# Patient Record
Sex: FEMALE | Race: WHITE | NOT HISPANIC OR LATINO
[De-identification: names, ages, dates, MRNs, and addresses within clinical notes are randomized per-mention and may not be internally consistent; named-entity substitution may affect disease eponyms.]

---

## 2018-04-25 PROBLEM — Z00.00 ENCOUNTER FOR PREVENTIVE HEALTH EXAMINATION: Status: ACTIVE | Noted: 2018-04-25

## 2018-06-19 ENCOUNTER — APPOINTMENT (OUTPATIENT)
Dept: DERMATOLOGY | Facility: CLINIC | Age: 83
End: 2018-06-19
Payer: MEDICARE

## 2018-06-19 PROCEDURE — 17000 DESTRUCT PREMALG LESION: CPT

## 2018-06-19 PROCEDURE — 99202 OFFICE O/P NEW SF 15 MIN: CPT | Mod: 25

## 2021-06-04 ENCOUNTER — EMERGENCY (EMERGENCY)
Facility: HOSPITAL | Age: 86
LOS: 1 days | Discharge: HOME | End: 2021-06-04
Attending: STUDENT IN AN ORGANIZED HEALTH CARE EDUCATION/TRAINING PROGRAM
Payer: MEDICARE

## 2021-06-04 VITALS
OXYGEN SATURATION: 98 % | TEMPERATURE: 98 F | DIASTOLIC BLOOD PRESSURE: 62 MMHG | HEART RATE: 61 BPM | SYSTOLIC BLOOD PRESSURE: 136 MMHG | RESPIRATION RATE: 18 BRPM

## 2021-06-04 PROCEDURE — 93005 ELECTROCARDIOGRAM TRACING: CPT

## 2021-06-04 PROCEDURE — 72125 CT NECK SPINE W/O DYE: CPT

## 2021-06-04 PROCEDURE — 99284 EMERGENCY DEPT VISIT MOD MDM: CPT | Mod: 25

## 2021-06-04 PROCEDURE — 99284 EMERGENCY DEPT VISIT MOD MDM: CPT

## 2021-06-04 PROCEDURE — 70450 CT HEAD/BRAIN W/O DYE: CPT | Mod: 26,MH

## 2021-06-04 PROCEDURE — 70450 CT HEAD/BRAIN W/O DYE: CPT

## 2021-06-04 PROCEDURE — 93010 ELECTROCARDIOGRAM REPORT: CPT

## 2021-06-04 PROCEDURE — 72125 CT NECK SPINE W/O DYE: CPT | Mod: 26,MH

## 2021-06-04 NOTE — ED PROVIDER NOTE - PATIENT PORTAL LINK FT
You can access the FollowMyHealth Patient Portal offered by [facility] by registering at the following website: [email or web address]. By joining [employer]’s FollowMyHealth portal, you will also be able to view your health information using other applications (apps) compatible with our system.

## 2021-06-04 NOTE — ED ADULT TRIAGE NOTE - CHIEF COMPLAINT QUOTE
pt awake and alert, baseline mental status, sent to ED from [facility] s/p unwitnessed fall outside bathroom. pt offers no complaints. MD called to bedside.

## 2021-06-04 NOTE — ED PROVIDER NOTE - ATTENDING CONTRIBUTION TO CARE
I performed a face to face history and physical exam of the patient and discussed their management with the student/resident/ACP. I reviewed the student/resident/ACP's note and agree with the documented findings and plan of care.    Pt state unable to provide hx 2/2 dementia. Pt found lying on the floor from assisted living next to bed. Pt with no complaints.    physical - rrr. ctab. abd - soft, nt. no edema. no rash. normal ROM of B/L hips.    plan - CTs neg. Pt cleared medically. daughter to bring her back to assisted living.

## 2021-06-04 NOTE — ED PROVIDER NOTE - OBJECTIVE STATEMENT
89y F w/ hx dementia, presenting from [facility] for apparent fall.  Per EMS/transfer papers, pt was found out of bed on the ground, with estimated downtime ~30 minutes.  Pt offers no complaints; does not know what happened.  Unable to provide further history due to dementia.  Pt reportedly at baseline mental status.  Takes ASA, no blood thinners. 89y F w/ hx dementia, DM, presenting from [facility] assisted living for apparent fall.  Per EMS/transfer papers, pt was found out of bed on the ground, with estimated downtime ~30 minutes.  Pt offers no complaints; does not know what happened.  Unable to provide further history due to dementia.  Pt reportedly at baseline mental status.  Takes ASA, no blood thinners.  Uses walker at baseline.  Had recent R radius fracture.

## 2021-06-04 NOTE — ED ADULT NURSE NOTE - NSIMPLEMENTINTERV_GEN_ALL_ED
Implemented All Fall with Harm Risk Interventions:  [place] to call system. Call bell, personal items and telephone within reach. Instruct patient to call for assistance. Room bathroom lighting operational. Non-slip footwear when patient is off stretcher. Physically safe environment: no spills, clutter or unnecessary equipment. Stretcher in lowest position, wheels locked, appropriate side rails in place. Provide visual cue, wrist band, yellow gown, etc. Monitor gait and stability. Monitor for mental status changes and reorient to person, place, and time. Review medications for side effects contributing to fall risk. Reinforce activity limits and safety measures with patient and family. Provide visual clues: red socks.

## 2021-06-04 NOTE — ED ADULT NURSE NOTE - CAS DISCH CONDITION
[] : The components of the vaccine(s) to be administered today are listed in the plan of care. The disease(s) for which the vaccine(s) are intended to prevent and the risks have been discussed with the caretaker.  The risks are also included in the appropriate vaccination information statements which have been provided to the patient's caregiver.  The caregiver has given consent to vaccinate. Stable Yes

## 2021-06-04 NOTE — ED PROVIDER NOTE - NSFOLLOWUPINSTRUCTIONS_ED_ALL_ED_FT
Fall Prevention for Older Adults    WHAT YOU NEED TO KNOW:    As you age, your muscles weaken and your risk for falls increases. Your risk also increases if you take medicines that make you sleepy or dizzy. You may also be at risk if you have vision or joint problems, have low blood pressure, or are not active.    DISCHARGE INSTRUCTIONS:    Call 911 or have someone else call if:   •You have fallen and are unconscious.      •You have fallen and cannot move part of your body.      Contact your healthcare provider if:   •You have fallen and have pain or a headache.      •You have questions or concerns about your condition or care.      Fall prevention tips:   •Stay active. Exercise can help strengthen your muscles and improve your balance. Your healthcare provider may recommend water aerobics, walking, or [place]. He or she may also recommend physical therapy to improve your coordination. Never start an exercise program without asking your healthcare provider first.  Water Aerobics for Seniors       Henry Chi for Seniors           •Wear shoes that fit well and have soles that . Wear shoes both inside and outside. Use slippers with good . Avoid shoes with high heels.      •Use assistive devices as directed. Your healthcare provider may suggest that you use a cane or walker to help you keep your balance. You may need to have grab bars put in your bathroom near the toilet or in the shower.      •Stand or sit up slowly. This may help you keep your balance and prevent falls.      •Wear a personal alarm. This is a device that allows you to call 911 if you need help. Ask for more information on personal alarms.      •Manage your medical conditions.  Keep all appointments with your healthcare providers. Visit your eye doctor as directed.      Home safety tips:     Fall Prevention for Seniors     •Add items to prevent falls in the bathroom. Put nonslip strips on your bath or shower floor to prevent you from slipping. Use a bath mat if you do not have carpet in the bathroom. This will prevent you from falling when you step out of the bath or shower. Use a shower seat so you do not need to stand while you shower. Sit on the toilet or a chair in your bathroom to dry yourself and put on clothing. This will prevent you from losing your balance from drying or dressing yourself while you are standing.      •Keep paths clear. Remove books, shoes, and other objects from walkways and stairs. Place cords for telephones and lamps out of the way so that you do not need to walk over them. Tape them down if you cannot move them. Remove small rugs. If you cannot remove a rug, secure it with double-sided tape. This will prevent you from tripping.      •Install bright lights in your home. Use night lights to help light paths to the bathroom or kitchen. Always turn on the light before you start walking.      •Keep items you use often on shelves within reach. Do not use a step stool to help you reach an item.      •Paint or place reflective tape on the edges of your stairs. This will help you see the stairs better.      Follow up with your healthcare provider as directed: Write down your questions so you remember to ask them during your visits.

## 2021-06-04 NOTE — ED PROVIDER NOTE - PHYSICAL EXAMINATION
Constitutional: Awake, alert, in no acute distress  Eyes: no scleral icterus  HENT: normocephalic, atraumatic, moist oral mucosa  Neck: supple, no tenderness  CV: RRR, no murmur  Pulm: non-labored respirations, CTAB  Abdomen: soft, non-tender, non-distended  Back: no tenderness  Extremities: no edema, no deformity, pelvis stable.  +Cast in place on R wrist/forearm.  Skin: no rash, no jaundice  Neuro: AAOx2, follows commands, moving all extremities equally, no focal deficits

## 2021-06-04 NOTE — ED ADULT NURSE NOTE - OBJECTIVE STATEMENT
88 y/o f awake, alert, oriented x2, confused to time. daughter at bedside. pt. states this is her baseline lately. pt. was recently placed on a memory unit in [facility]. as per daughter [facility] called and told her she was found on the floor and was unable to get her up so called an ambulance. redness noted to side of rt. knee and rt. hip. pt. denies pain or discomfort at this time, breathing even and unlabored.

## 2021-06-04 NOTE — ED PROVIDER NOTE - PROGRESS NOTE DETAILS
Heber: Spoke to pt's daughter at bedside, who is aware of incidental CT findings of thyroid and pulmonary nodules.  States that pt is acting at baseline mental status.  She is comfortable taking pt back to dementia unit at [facility].  [facility] made aware that pt will be discharged and returning to them.

## 2021-06-04 NOTE — ED PROVIDER NOTE - CLINICAL SUMMARY MEDICAL DECISION MAKING FREE TEXT BOX
89y F w/ dementia, presenting from nursing home after apparent fall.  Pt in no distress, offers no complaints, no evidence of trauma on exam, stable VS.  Will check CT head/cspine, reassess. 89y F w/ dementia, DM, presenting from assisted living after apparent fall.  Pt in no distress, offers no complaints, no evidence of trauma on exam, stable VS.  Will check CT head/cspine, reassess.

## 2021-06-18 ENCOUNTER — EMERGENCY (EMERGENCY)
Facility: HOSPITAL | Age: 86
LOS: 1 days | Discharge: HOME | End: 2021-06-18
Attending: STUDENT IN AN ORGANIZED HEALTH CARE EDUCATION/TRAINING PROGRAM
Payer: MEDICARE

## 2021-06-18 VITALS
SYSTOLIC BLOOD PRESSURE: 155 MMHG | OXYGEN SATURATION: 95 % | RESPIRATION RATE: 18 BRPM | TEMPERATURE: 98 F | DIASTOLIC BLOOD PRESSURE: 85 MMHG | HEART RATE: 100 BPM

## 2021-06-18 VITALS
OXYGEN SATURATION: 98 % | HEART RATE: 51 BPM | TEMPERATURE: 98 F | SYSTOLIC BLOOD PRESSURE: 158 MMHG | DIASTOLIC BLOOD PRESSURE: 97 MMHG | RESPIRATION RATE: 18 BRPM

## 2021-06-18 PROCEDURE — 72125 CT NECK SPINE W/O DYE: CPT

## 2021-06-18 PROCEDURE — 93010 ELECTROCARDIOGRAM REPORT: CPT

## 2021-06-18 PROCEDURE — 70450 CT HEAD/BRAIN W/O DYE: CPT

## 2021-06-18 PROCEDURE — 70450 CT HEAD/BRAIN W/O DYE: CPT | Mod: 26,MH

## 2021-06-18 PROCEDURE — 82962 GLUCOSE BLOOD TEST: CPT

## 2021-06-18 PROCEDURE — 99284 EMERGENCY DEPT VISIT MOD MDM: CPT

## 2021-06-18 PROCEDURE — 93005 ELECTROCARDIOGRAM TRACING: CPT

## 2021-06-18 PROCEDURE — 72125 CT NECK SPINE W/O DYE: CPT | Mod: 26,MH

## 2021-06-18 PROCEDURE — 99284 EMERGENCY DEPT VISIT MOD MDM: CPT | Mod: 25

## 2021-06-18 NOTE — ED ADULT TRIAGE NOTE - CHIEF COMPLAINT QUOTE
pt found on floor at [facility]. pt with no complaints and no signs of trauma. pt doesn't remember  what happened. pt confused at baseline.

## 2021-06-18 NOTE — ED PROVIDER NOTE - PATIENT PORTAL LINK FT
You can access the FollowMyHealth Patient Portal offered by [facility] by registering at the following website: [email or web address]. By joining [employer]’s FollowMyHealth portal, you will also be able to view your health information using other applications (apps) compatible with our system.

## 2021-06-18 NOTE — ED PROVIDER NOTE - PHYSICAL EXAMINATION
General-alert and oriented to person, nontoxic appearing, pleasant cooperative, NAD  HEENT-normocephalic, atraumatic, NT to palp, EOMI, PERRLA, no conjunctival injections, nares patent,   Neck- supple, trach midline, No JVD, no LAD  Chest-  no reproducible pain  Cardio-s1,s2 present, regular rate and rhythm  Resp- talks in full sentences, symmetrical chest rise, CTA bilat,   Abdomen- soft, NT/ND, no guarding, no rebound tenderness  MSK- moves all extremities, R wrist cast noted,   Back- nt to palp of cervical, thoracic, lumbar spine, nt to palp of paraspinal m., No CVA tenderness  Neuro- no focal deficits, sensation intact

## 2021-06-18 NOTE — ED PROVIDER NOTE - ATTENDING CONTRIBUTION TO CARE
88yo female with pmh of DM and dementia presents from the [facility] for unwitnessed fall. Pt was found on the floor by staff unsure what happened. Pt at baseline confused, doesn't recall events. Pt denies all complaints at this time, denies any pain. Not on AC per chart  Const: Awake, alert no acute distress. Well appearing.  HEENT: NC/AT. Moist mucous membranes.  Eyes: No scleral icterus. EOMI.  Neck:. Soft and supple. Full ROM without pain.  Cardiac: Regular rate and regular rhythm. +S1/S2. Peripheral pulses 2+ and symmetric. No LE edema.  Resp: Speaking in full sentences. No evidence of respiratory distress. No wheezes, rales or rhonchi.  Abd: Soft, non-tender, non-distended. Normal bowel sounds in all 4 quadrants. No guarding or rebound.  Back: Spine midline and non-tender. No CVAT.  Skin: No rashes, abrasions or lacerations.  Lymph: No cervical lymphadenopathy.  Neuro: Awake, alert  Moves all extremities symmetrically.  no acute traumatic findings on CT, ekg at baseline, stable to return to [facility]

## 2021-06-18 NOTE — ED PROVIDER NOTE - OBJECTIVE STATEMENT
89 y.o female with hx of DM, dementia presents to the ED sent in from [facility] for fall. Pt was found on the floor by staff, unknown what happened. Unknown down time. Pt confused at baseline. Pt does not recall the events. Usually ambulates with walker at baseline. Pt offers no complaints at this time. Unable to provide further history. History of radial fracture of the right arm. Currently wearing cast. Pt not currently on blood thinner, Takes 81 mg aspirin daily 89 y.o female with hx of DM, dementia presents to the ED sent in from [facility] for fall. Pt was found on the floor by staff, unknown what happened. Unknown down time. Pt confused at baseline. Pt does not recall the events. Usually ambulates with walker at baseline. Pt offers no complaints at this time. Unable to provide further history. History of radial fracture of the right arm. Currently wearing cast. Pt not currently on blood thinner, Takes 81 mg aspirin daily

## 2021-06-18 NOTE — ED PROVIDER NOTE - NSFOLLOWUPINSTRUCTIONS_ED_ALL_ED_FT
1) Please follow-up with your primary care doctor in the next 5-7 days.  Please call tomorrow for an appointment.  If you cannot follow-up with your primary care doctor please return to the ED for any urgent issues.  2) You were given a copy of the tests performed today.  Please bring the results with you and review them with your primary care doctor.  3) If you have any worsening of symptoms or any other concerns please return to the ED immediately.  4) Please continue taking your home medications as directed. detailed exam

## 2021-06-18 NOTE — ED PROVIDER NOTE - CLINICAL SUMMARY MEDICAL DECISION MAKING FREE TEXT BOX
89 y.o female with hx of DM, dementia presents to the ED sent in from [facility] for fall. confused at baseline, unable to provide much history, no evidence of trauma noted, ekg shows bradycardia at 53, no evidence of acute ischemia, ct head and neck show 89 y.o female with hx of DM, dementia presents to the ED sent in from [facility] for fall. confused at baseline, unable to provide much history, no evidence of trauma noted, ekg shows bradycardia at 53, no evidence of acute ischemia, ct head and neck show no acute abnormalities, fs wnl

## 2021-06-18 NOTE — ED ADULT NURSE NOTE - OBJECTIVE STATEMENT
Patient confused at baseline sent in from [facility] s/p unwitnessed fall found on floor by staff.  patient does not remember falling.  Patient ambulates with walker at baseline.  Patient has no complaints at this time.  Patient currently wearing cast on right wrist for radial fx.

## 2021-10-22 ENCOUNTER — EMERGENCY (EMERGENCY)
Facility: HOSPITAL | Age: 86
LOS: 1 days | Discharge: HOME | End: 2021-10-22
Attending: STUDENT IN AN ORGANIZED HEALTH CARE EDUCATION/TRAINING PROGRAM
Payer: MEDICARE

## 2021-10-22 VITALS
RESPIRATION RATE: 17 BRPM | SYSTOLIC BLOOD PRESSURE: 143 MMHG | TEMPERATURE: 99 F | HEART RATE: 83 BPM | WEIGHT: 115.08 LBS | OXYGEN SATURATION: 94 % | DIASTOLIC BLOOD PRESSURE: 69 MMHG

## 2021-10-22 PROCEDURE — 99285 EMERGENCY DEPT VISIT HI MDM: CPT

## 2021-10-22 RX ORDER — HALOPERIDOL DECANOATE 100 MG/ML
2.5 INJECTION INTRAMUSCULAR ONCE
Refills: 0 | Status: COMPLETED | OUTPATIENT
Start: 2021-10-22 | End: 2021-10-22

## 2021-10-22 RX ADMIN — Medication 0.5 MILLIGRAM(S): at 23:28

## 2021-10-22 RX ADMIN — HALOPERIDOL DECANOATE 2.5 MILLIGRAM(S): 100 INJECTION INTRAMUSCULAR at 23:31

## 2021-10-22 NOTE — ED ADULT NURSE NOTE - CHIEF COMPLAINT QUOTE
Patient BIBA from the [facility], pt s/p mechanical fall. Pt states that she was holding her walker and fell backward hitting her head on the floor. -LOC, -blood thinners. Pt denies any pain.

## 2021-10-22 NOTE — ED PROVIDER NOTE - PATIENT PORTAL LINK FT
You can access the FollowMyHealth Patient Portal offered by [facility] by registering at the following website: [email or web address]. By joining [employer]’s FollowMyHealth portal, you will also be able to view your health information using other applications (apps) compatible with our system.

## 2021-10-22 NOTE — ED PROVIDER NOTE - OBJECTIVE STATEMENT
The patient is a 89 year old female presents a fall mechanical fall and denies any complaints  No HA, No CP, No SOB, No abd pain, No hip pain

## 2021-10-22 NOTE — ED ADULT TRIAGE NOTE - CHIEF COMPLAINT QUOTE
Patient BIBA from the [facility], pt s/p mechanical fall. Pt states that she was holding her walker and fell backward hitting her head on the floor. -LOC, -blood thinners. Pt denies any pain.

## 2021-10-22 NOTE — ED PROVIDER NOTE - NSFOLLOWUPINSTRUCTIONS_ED_ALL_ED_FT
Closed Head Injury    A closed head injury is an injury to your head that may or may not involve a traumatic brain injury (TBI). Symptoms of TBI can be short or long lasting and include headache, dizziness, interference with memory or speech, fatigue, confusion, changes in sleep, mood changes, nausea, depression/anxiety, and dulling of senses. Make sure to obtain proper rest which includes getting plenty of sleep, avoiding excessive visual stimulation, and avoiding activities that may cause physical or mental stress. Avoid any situation where there is potential for another head injury, including sports.    SEEK IMMEDIATE MEDICAL CARE IF YOU HAVE ANY OF THE FOLLOWING SYMPTOMS: unusual drowsiness, vomiting, severe dizziness, seizures, lightheadedness, muscular weakness, different pupil sizes, visual changes, or clear or bloody discharge from your ears or nose.    -Please follow-up with your primary care doctor in the next 2 days.  Please call tomorrow for an appointment.  If you cannot follow-up with your primary care doctor please return to the ED for any urgent issues.  - You were given a copy of the tests performed today.  Please bring the results with you and review them with your primary care doctor.  - If you have any worsening of symptoms or any other concerns please return to the ED immediately.  - Please continue taking your home medications as directed.

## 2021-10-22 NOTE — ED ADULT NURSE NOTE - OBJECTIVE STATEMENT
[facility] s/p mechanical fall "tripped over walker", pt reports + head trauma - loc. offers no complaints, denies anticoag use. [facility] s/p mechanical fall "tripped over walker", pt reports + head trauma - loc. offers no complaints, denies anticoag use. poor historian

## 2021-10-23 VITALS
DIASTOLIC BLOOD PRESSURE: 93 MMHG | OXYGEN SATURATION: 98 % | SYSTOLIC BLOOD PRESSURE: 145 MMHG | RESPIRATION RATE: 16 BRPM | TEMPERATURE: 99 F | HEART RATE: 76 BPM

## 2021-10-23 PROBLEM — F03.90 UNSPECIFIED DEMENTIA, UNSPECIFIED SEVERITY, WITHOUT BEHAVIORAL DISTURBANCE, PSYCHOTIC DISTURBANCE, MOOD DISTURBANCE, AND ANXIETY: Chronic | Status: ACTIVE | Noted: 2021-06-04

## 2021-10-23 PROBLEM — E11.9 TYPE 2 DIABETES MELLITUS WITHOUT COMPLICATIONS: Chronic | Status: ACTIVE | Noted: 2021-06-04

## 2021-10-23 PROCEDURE — 72125 CT NECK SPINE W/O DYE: CPT | Mod: 26,MA

## 2021-10-23 PROCEDURE — 70450 CT HEAD/BRAIN W/O DYE: CPT | Mod: MA

## 2021-10-23 PROCEDURE — 96372 THER/PROPH/DIAG INJ SC/IM: CPT

## 2021-10-23 PROCEDURE — 99284 EMERGENCY DEPT VISIT MOD MDM: CPT | Mod: 25

## 2021-10-23 PROCEDURE — 70450 CT HEAD/BRAIN W/O DYE: CPT | Mod: 26,MA

## 2021-10-23 PROCEDURE — 72125 CT NECK SPINE W/O DYE: CPT | Mod: MA

## 2021-10-23 RX ORDER — HALOPERIDOL DECANOATE 100 MG/ML
2.5 INJECTION INTRAMUSCULAR ONCE
Refills: 0 | Status: COMPLETED | OUTPATIENT
Start: 2021-10-23 | End: 2021-10-23

## 2021-10-23 RX ADMIN — HALOPERIDOL DECANOATE 2.5 MILLIGRAM(S): 100 INJECTION INTRAMUSCULAR at 01:45

## 2021-10-25 ENCOUNTER — EMERGENCY (EMERGENCY)
Facility: HOSPITAL | Age: 86
LOS: 1 days | Discharge: HOME | End: 2021-10-25
Attending: EMERGENCY MEDICINE
Payer: MEDICARE

## 2021-10-25 VITALS
DIASTOLIC BLOOD PRESSURE: 71 MMHG | OXYGEN SATURATION: 99 % | SYSTOLIC BLOOD PRESSURE: 147 MMHG | HEART RATE: 81 BPM | RESPIRATION RATE: 18 BRPM | TEMPERATURE: 98 F

## 2021-10-25 VITALS
HEART RATE: 85 BPM | RESPIRATION RATE: 18 BRPM | TEMPERATURE: 98 F | SYSTOLIC BLOOD PRESSURE: 136 MMHG | OXYGEN SATURATION: 98 % | DIASTOLIC BLOOD PRESSURE: 75 MMHG

## 2021-10-25 PROCEDURE — 72131 CT LUMBAR SPINE W/O DYE: CPT | Mod: 26,ME

## 2021-10-25 PROCEDURE — 99284 EMERGENCY DEPT VISIT MOD MDM: CPT | Mod: 25

## 2021-10-25 PROCEDURE — 72131 CT LUMBAR SPINE W/O DYE: CPT | Mod: ME

## 2021-10-25 PROCEDURE — G1004: CPT

## 2021-10-25 PROCEDURE — 99284 EMERGENCY DEPT VISIT MOD MDM: CPT | Mod: GC

## 2021-10-25 PROCEDURE — 70450 CT HEAD/BRAIN W/O DYE: CPT | Mod: MG

## 2021-10-25 PROCEDURE — 72125 CT NECK SPINE W/O DYE: CPT | Mod: MG

## 2021-10-25 PROCEDURE — 72128 CT CHEST SPINE W/O DYE: CPT | Mod: 26,ME

## 2021-10-25 PROCEDURE — 70450 CT HEAD/BRAIN W/O DYE: CPT | Mod: 26,MG

## 2021-10-25 PROCEDURE — 72125 CT NECK SPINE W/O DYE: CPT | Mod: 26,MG

## 2021-10-25 PROCEDURE — 72128 CT CHEST SPINE W/O DYE: CPT | Mod: ME

## 2021-10-25 NOTE — ED PROVIDER NOTE - PHYSICAL EXAMINATION
General: Well appearing female in no acute distress  HEENT: Normocephalic, atraumatic. Moist mucous membranes. Oropharynx clear. No lymphadenopathy.  Eyes: No scleral icterus. EOMI. EVIE.  Neck:. Soft and supple. Full ROM without pain. No midline tenderness  Cardiac: Regular rate and regular rhythm. No murmurs, rubs, gallops. Peripheral pulses 2+ and symmetric. No LE edema.  Resp: Lungs CTAB. Speaking in full sentences. No wheezes, rales or rhonchi.  Abd: Soft, non-tender, non-distended. No guarding or rebound. No scars, masses, or lesions.  Back: Spine midline and non-tender. No CVA tenderness.    Skin: No rashes, abrasions, or lacerations.  Neuro: AO x 2. Moves all extremities symmetrically. Motor strength and sensation grossly intact.

## 2021-10-25 NOTE — ED PROVIDER NOTE - CLINICAL SUMMARY MEDICAL DECISION MAKING FREE TEXT BOX
89 y/o F hx of recurrent falls, dementia, DM brought in by EMS from [facility] for unwitnessed fall.   collateral obtained from staff member at [facility] - unwitnessed fall, unknown LOC, not on blood thinners. pt endorsed upper back pain, new since last fall 3 days ago. possibly struck head  pt is A&Ox2 on exam, no signs of external trauma  CT head - r/o bleed  CT C/T/L spine - r/o fx

## 2021-10-25 NOTE — ED PROVIDER NOTE - ATTENDING CONTRIBUTION TO CARE
I personally saw the patient with the resident, and completed the key components of the history and physical exam. I then discussed the management plan with the resident.   gen in nad resp clear cardiac no murmur abd soft neuro intact msk + ttp paraspinal cerivcal thoracic region   agree with resident plan of care

## 2021-10-25 NOTE — ED ADULT NURSE NOTE - NS ED NURSE AMBULANCES2
Detail Level: Detailed Quality 226: Preventive Care And Screening: Tobacco Use: Screening And Cessation Intervention: Patient screened for tobacco use, is a smoker AND received Cessation Counseling Quality 431: Preventive Care And Screening: Unhealthy Alcohol Use - Screening: Patient screened for unhealthy alcohol use using a single question and scores less than 2 times per year Quality 402: Tobacco Use And Help With Quitting Among Adolescents: Patient screened for tobacco and is a smoker AND received Cessation Counseling Ambulnz

## 2021-10-25 NOTE — ED PROVIDER NOTE - OBJECTIVE STATEMENT
89 y/o F hx of recurrent falls, dementia, DM brought in by EMS from [facility] for unwitnessed fall. collateral obtained from emily, technician at facility, via telephone call. per staff member, patient had an unwitnessed fall and staff found her on the ground shortly after hearing a loud noise. states patient was awake, possibly struck head, not on blood thinners. states that patient was endorsing upper back pain s/p fall. unknown LOC. patient endorses upper back pain as well. denies abdominal pain. denies chest pain/sob.

## 2021-10-25 NOTE — CHART NOTE - NSCHARTNOTEFT_GEN_A_CORE
,  Refill request is for a maintenance medication and has met the criteria specified in the Ambulatory Medication Refill Standing Order for eligibility, visits, laboratory, alerts and was sent to the requested pharmacy.     Requested Prescriptions     [facility] SOCIAL WORK NOTE:  SW NOTED ON BOARD THAT IT REPORTED PATIENT WAS READY FOR DC TO THE [facility]. AMBULANCE ARRANGED BY ED AMEZQUITA CLERK AND PATIENT ALREADY GONE PRIOR TO COMPLETING NEAF AND TRANSPORT FORM.  SPOKE WITH BENI AT [facility] AND MADE HER AWARE.  PATIENT ACCEPTED BACK AND DC PAPERS FAXED TO [facility] # [phone].

## 2021-10-25 NOTE — ED PROVIDER NOTE - PATIENT PORTAL LINK FT
You can access the FollowMyHealth Patient Portal offered by [facility] by registering at the following website: [email or web address]. By joining [employer]’s FollowMyHealth portal, you will also be able to view your health information using other applications (apps) compatible with our system.

## 2021-10-26 ENCOUNTER — EMERGENCY (EMERGENCY)
Facility: HOSPITAL | Age: 86
LOS: 1 days | Discharge: HOME | End: 2021-10-26
Attending: EMERGENCY MEDICINE
Payer: MEDICARE

## 2021-10-26 VITALS
TEMPERATURE: 98 F | OXYGEN SATURATION: 97 % | DIASTOLIC BLOOD PRESSURE: 78 MMHG | HEART RATE: 81 BPM | WEIGHT: 130.07 LBS | SYSTOLIC BLOOD PRESSURE: 155 MMHG | RESPIRATION RATE: 20 BRPM

## 2021-10-26 VITALS
RESPIRATION RATE: 20 BRPM | SYSTOLIC BLOOD PRESSURE: 165 MMHG | TEMPERATURE: 98 F | HEART RATE: 78 BPM | DIASTOLIC BLOOD PRESSURE: 70 MMHG | OXYGEN SATURATION: 98 %

## 2021-10-26 LAB
ALBUMIN SERPL ELPH-MCNC: 4.3 G/DL — SIGNIFICANT CHANGE UP (ref 3.3–5.2)
ALP SERPL-CCNC: 187 U/L — HIGH (ref 40–120)
ALT FLD-CCNC: 49 U/L — HIGH
ANION GAP SERPL CALC-SCNC: 15 MMOL/L — SIGNIFICANT CHANGE UP (ref 5–17)
APPEARANCE UR: CLEAR — SIGNIFICANT CHANGE UP
AST SERPL-CCNC: 39 U/L — HIGH
BACTERIA # UR AUTO: ABNORMAL
BASOPHILS # BLD AUTO: 0.01 K/UL — SIGNIFICANT CHANGE UP (ref 0–0.2)
BASOPHILS NFR BLD AUTO: 0.3 % — SIGNIFICANT CHANGE UP (ref 0–2)
BILIRUB SERPL-MCNC: 0.9 MG/DL — SIGNIFICANT CHANGE UP (ref 0.4–2)
BILIRUB UR-MCNC: NEGATIVE — SIGNIFICANT CHANGE UP
BUN SERPL-MCNC: 14.3 MG/DL — SIGNIFICANT CHANGE UP (ref 8–20)
CALCIUM SERPL-MCNC: 8.8 MG/DL — SIGNIFICANT CHANGE UP (ref 8.6–10.2)
CHLORIDE SERPL-SCNC: 100 MMOL/L — SIGNIFICANT CHANGE UP (ref 98–107)
CK MB CFR SERPL CALC: 3.4 NG/ML — SIGNIFICANT CHANGE UP (ref 0–6.7)
CK SERPL-CCNC: 235 U/L — HIGH (ref 25–170)
CO2 SERPL-SCNC: 25 MMOL/L — SIGNIFICANT CHANGE UP (ref 22–29)
COLOR SPEC: YELLOW — SIGNIFICANT CHANGE UP
CREAT SERPL-MCNC: 0.72 MG/DL — SIGNIFICANT CHANGE UP (ref 0.5–1.3)
DIFF PNL FLD: NEGATIVE — SIGNIFICANT CHANGE UP
EOSINOPHIL # BLD AUTO: 0.12 K/UL — SIGNIFICANT CHANGE UP (ref 0–0.5)
EOSINOPHIL NFR BLD AUTO: 3.1 % — SIGNIFICANT CHANGE UP (ref 0–6)
EPI CELLS # UR: SIGNIFICANT CHANGE UP
GLUCOSE BLDC GLUCOMTR-MCNC: 129 MG/DL — HIGH (ref 70–99)
GLUCOSE BLDC GLUCOMTR-MCNC: 137 MG/DL — HIGH (ref 70–99)
GLUCOSE BLDC GLUCOMTR-MCNC: 145 MG/DL — HIGH (ref 70–99)
GLUCOSE BLDC GLUCOMTR-MCNC: 160 MG/DL — HIGH (ref 70–99)
GLUCOSE SERPL-MCNC: 154 MG/DL — HIGH (ref 70–99)
GLUCOSE UR QL: NEGATIVE MG/DL — SIGNIFICANT CHANGE UP
HCT VFR BLD CALC: 37.3 % — SIGNIFICANT CHANGE UP (ref 34.5–45)
HGB BLD-MCNC: 12.6 G/DL — SIGNIFICANT CHANGE UP (ref 11.5–15.5)
IMM GRANULOCYTES NFR BLD AUTO: 0 % — SIGNIFICANT CHANGE UP (ref 0–1.5)
KETONES UR-MCNC: NEGATIVE — SIGNIFICANT CHANGE UP
LEUKOCYTE ESTERASE UR-ACNC: ABNORMAL
LYMPHOCYTES # BLD AUTO: 0.99 K/UL — LOW (ref 1–3.3)
LYMPHOCYTES # BLD AUTO: 25.5 % — SIGNIFICANT CHANGE UP (ref 13–44)
MAGNESIUM SERPL-MCNC: 1.5 MG/DL — LOW (ref 1.6–2.6)
MCHC RBC-ENTMCNC: 30.8 PG — SIGNIFICANT CHANGE UP (ref 27–34)
MCHC RBC-ENTMCNC: 33.8 GM/DL — SIGNIFICANT CHANGE UP (ref 32–36)
MCV RBC AUTO: 91.2 FL — SIGNIFICANT CHANGE UP (ref 80–100)
MONOCYTES # BLD AUTO: 0.64 K/UL — SIGNIFICANT CHANGE UP (ref 0–0.9)
MONOCYTES NFR BLD AUTO: 16.5 % — HIGH (ref 2–14)
NEUTROPHILS # BLD AUTO: 2.12 K/UL — SIGNIFICANT CHANGE UP (ref 1.8–7.4)
NEUTROPHILS NFR BLD AUTO: 54.6 % — SIGNIFICANT CHANGE UP (ref 43–77)
NITRITE UR-MCNC: NEGATIVE — SIGNIFICANT CHANGE UP
PH UR: 5 — SIGNIFICANT CHANGE UP (ref 5–8)
PLATELET # BLD AUTO: 155 K/UL — SIGNIFICANT CHANGE UP (ref 150–400)
POTASSIUM SERPL-MCNC: 3.9 MMOL/L — SIGNIFICANT CHANGE UP (ref 3.5–5.3)
POTASSIUM SERPL-SCNC: 3.9 MMOL/L — SIGNIFICANT CHANGE UP (ref 3.5–5.3)
PROT SERPL-MCNC: 7.5 G/DL — SIGNIFICANT CHANGE UP (ref 6.6–8.7)
PROT UR-MCNC: 30 MG/DL
RAPID RVP RESULT: SIGNIFICANT CHANGE UP
RBC # BLD: 4.09 M/UL — SIGNIFICANT CHANGE UP (ref 3.8–5.2)
RBC # FLD: 12.6 % — SIGNIFICANT CHANGE UP (ref 10.3–14.5)
RBC CASTS # UR COMP ASSIST: SIGNIFICANT CHANGE UP /HPF (ref 0–4)
SARS-COV-2 RNA SPEC QL NAA+PROBE: SIGNIFICANT CHANGE UP
SODIUM SERPL-SCNC: 140 MMOL/L — SIGNIFICANT CHANGE UP (ref 135–145)
SP GR SPEC: 1.01 — SIGNIFICANT CHANGE UP (ref 1.01–1.02)
TROPONIN T SERPL-MCNC: <0.01 NG/ML — SIGNIFICANT CHANGE UP (ref 0–0.06)
UROBILINOGEN FLD QL: NEGATIVE MG/DL — SIGNIFICANT CHANGE UP
WBC # BLD: 3.88 K/UL — SIGNIFICANT CHANGE UP (ref 3.8–10.5)
WBC # FLD AUTO: 3.88 K/UL — SIGNIFICANT CHANGE UP (ref 3.8–10.5)
WBC UR QL: SIGNIFICANT CHANGE UP

## 2021-10-26 PROCEDURE — 36415 COLL VENOUS BLD VENIPUNCTURE: CPT

## 2021-10-26 PROCEDURE — 72125 CT NECK SPINE W/O DYE: CPT | Mod: 26,MB

## 2021-10-26 PROCEDURE — 85025 COMPLETE CBC W/AUTO DIFF WBC: CPT

## 2021-10-26 PROCEDURE — 87086 URINE CULTURE/COLONY COUNT: CPT

## 2021-10-26 PROCEDURE — 80053 COMPREHEN METABOLIC PANEL: CPT

## 2021-10-26 PROCEDURE — 83735 ASSAY OF MAGNESIUM: CPT

## 2021-10-26 PROCEDURE — 70450 CT HEAD/BRAIN W/O DYE: CPT | Mod: MB

## 2021-10-26 PROCEDURE — 99284 EMERGENCY DEPT VISIT MOD MDM: CPT | Mod: 25

## 2021-10-26 PROCEDURE — 0225U NFCT DS DNA&RNA 21 SARSCOV2: CPT

## 2021-10-26 PROCEDURE — 99234 HOSP IP/OBS SM DT SF/LOW 45: CPT

## 2021-10-26 PROCEDURE — 93005 ELECTROCARDIOGRAM TRACING: CPT

## 2021-10-26 PROCEDURE — 84484 ASSAY OF TROPONIN QUANT: CPT

## 2021-10-26 PROCEDURE — 93010 ELECTROCARDIOGRAM REPORT: CPT

## 2021-10-26 PROCEDURE — 70450 CT HEAD/BRAIN W/O DYE: CPT | Mod: 26,MB

## 2021-10-26 PROCEDURE — G0378: CPT

## 2021-10-26 PROCEDURE — 72125 CT NECK SPINE W/O DYE: CPT | Mod: MB

## 2021-10-26 PROCEDURE — 82962 GLUCOSE BLOOD TEST: CPT

## 2021-10-26 PROCEDURE — 81001 URINALYSIS AUTO W/SCOPE: CPT

## 2021-10-26 PROCEDURE — 73522 X-RAY EXAM HIPS BI 3-4 VIEWS: CPT

## 2021-10-26 PROCEDURE — 82550 ASSAY OF CK (CPK): CPT

## 2021-10-26 PROCEDURE — 82553 CREATINE MB FRACTION: CPT

## 2021-10-26 PROCEDURE — 73522 X-RAY EXAM HIPS BI 3-4 VIEWS: CPT | Mod: 26

## 2021-10-26 RX ORDER — ACETAMINOPHEN 500 MG
650 TABLET ORAL EVERY 8 HOURS
Refills: 0 | Status: DISCONTINUED | OUTPATIENT
Start: 2021-10-26 | End: 2021-10-30

## 2021-10-26 RX ORDER — QUETIAPINE FUMARATE 200 MG/1
25 TABLET, FILM COATED ORAL AT BEDTIME
Refills: 0 | Status: DISCONTINUED | OUTPATIENT
Start: 2021-10-26 | End: 2021-10-30

## 2021-10-26 RX ORDER — INSULIN LISPRO 100/ML
VIAL (ML) SUBCUTANEOUS
Refills: 0 | Status: DISCONTINUED | OUTPATIENT
Start: 2021-10-26 | End: 2021-10-30

## 2021-10-26 RX ORDER — ASPIRIN/CALCIUM CARB/MAGNESIUM 324 MG
81 TABLET ORAL DAILY
Refills: 0 | Status: DISCONTINUED | OUTPATIENT
Start: 2021-10-26 | End: 2021-10-30

## 2021-10-26 RX ORDER — SODIUM CHLORIDE 9 MG/ML
1000 INJECTION, SOLUTION INTRAVENOUS
Refills: 0 | Status: DISCONTINUED | OUTPATIENT
Start: 2021-10-26 | End: 2021-10-30

## 2021-10-26 RX ORDER — DEXTROSE 50 % IN WATER 50 %
12.5 SYRINGE (ML) INTRAVENOUS ONCE
Refills: 0 | Status: DISCONTINUED | OUTPATIENT
Start: 2021-10-26 | End: 2021-10-30

## 2021-10-26 RX ORDER — DEXTROSE 50 % IN WATER 50 %
15 SYRINGE (ML) INTRAVENOUS ONCE
Refills: 0 | Status: DISCONTINUED | OUTPATIENT
Start: 2021-10-26 | End: 2021-10-30

## 2021-10-26 RX ORDER — DEXTROSE 50 % IN WATER 50 %
25 SYRINGE (ML) INTRAVENOUS ONCE
Refills: 0 | Status: DISCONTINUED | OUTPATIENT
Start: 2021-10-26 | End: 2021-10-30

## 2021-10-26 RX ORDER — GLUCAGON INJECTION, SOLUTION 0.5 MG/.1ML
1 INJECTION, SOLUTION SUBCUTANEOUS ONCE
Refills: 0 | Status: DISCONTINUED | OUTPATIENT
Start: 2021-10-26 | End: 2021-10-30

## 2021-10-26 RX ORDER — SERTRALINE 25 MG/1
25 TABLET, FILM COATED ORAL DAILY
Refills: 0 | Status: DISCONTINUED | OUTPATIENT
Start: 2021-10-26 | End: 2021-10-30

## 2021-10-26 RX ADMIN — SERTRALINE 25 MILLIGRAM(S): 25 TABLET, FILM COATED ORAL at 12:38

## 2021-10-26 RX ADMIN — Medication 0: at 19:08

## 2021-10-26 RX ADMIN — Medication 0: at 12:38

## 2021-10-26 RX ADMIN — Medication 81 MILLIGRAM(S): at 12:38

## 2021-10-26 NOTE — PROVIDER CONTACT NOTE (OTHER) - ACTION/TREATMENT ORDERED:
If pt remains inpatient, PT will be kept on program. Seen 3-5xwk. 1 Week goal: independent ambulation w/ RW 150ft

## 2021-10-26 NOTE — ED PROVIDER NOTE - OBJECTIVE STATEMENT
pt is a 89 y/o female BIBA with a pmhx of dementia, frequent falls brought in by [facility] for fall. called over to [facility] who stated that patient was found on the ground, unwitnessed fall unsure if head injury. patient states she does not remember what happened. pt with no complaints at this time. pt was seen at [facility] one day ago for different fall. pt denies cp sob headache neck pain visual changes abd pain back pain numbness or loss of sensation nausea vomiting urinary or fecal incontience

## 2021-10-26 NOTE — ED ADULT TRIAGE NOTE - CHIEF COMPLAINT QUOTE
Coming from the [facility] S/P fall from standing height.  Unwitnessed fall by staff.  Pt unsure if she hit her head.  MAEx4.  Denies any pain at this time.  Takes aspirin daily.

## 2021-10-26 NOTE — ED ADULT NURSE REASSESSMENT NOTE - NS ED NURSE REASSESS COMMENT FT1
patient's daughter updated, patient cleared by PT to return, patient awaiting ride, Lynne FITZGERALD securing ride

## 2021-10-26 NOTE — ED PROVIDER NOTE - ATTENDING CONTRIBUTION TO CARE
89 yo well appearing female presenting to the ED for fall; again. I personally saw the patient with the PA, and completed the key components of the history and physical exam. I then discussed the management plan with the PA.

## 2021-10-26 NOTE — ED ADULT NURSE REASSESSMENT NOTE - COMFORT CARE
assisted to bathroom/ambulated to bathroom/po fluids offered/repositioned/side rails up/warm blanket provided

## 2021-10-26 NOTE — ED CDU PROVIDER DISPOSITION NOTE - PATIENT PORTAL LINK FT
You can access the FollowMyHealth Patient Portal offered by [facility] by registering at the following website: [email or web address]. By joining [employer]’s FollowMyHealth portal, you will also be able to view your health information using other applications (apps) compatible with our system.

## 2021-10-26 NOTE — ED CDU PROVIDER INITIAL DAY NOTE - ATTENDING CONTRIBUTION TO CARE
89 yo well appearing female presenting to the ED for fall; again. I personally saw the patient with the PA, and completed the key components of the history and physical exam. I then discussed the management plan with the PA.

## 2021-10-26 NOTE — PHYSICAL THERAPY INITIAL EVALUATION ADULT - ADDITIONAL COMMENTS
Pt alert but confused, provides limited hx. Per EMR and conversations with CM/SW, pt is an assisted living resident. Ambulatory with a RW.

## 2021-10-26 NOTE — ED ADULT TRIAGE NOTE - NS ED TRIAGE AVPU SCALE
Gerard Travis is a 68 year old White male who returns to clinic almost 6 weeks status post left above-the-knee amputation.  He continues to see Edward in the wound clinic for his right lower extremity ulcers/wounds.  He tells me that he is hopping, bracing himself on counter tops etcetera at home and therefore has been able to be quite active.  He has an appointment with the prosthetist next week.    Current Outpatient Medications   Medication Sig Dispense Refill   • amLODIPine (NORVASC) 5 MG tablet Take 1 tablet by mouth daily. 30 tablet 0   • aspirin 81 MG EC tablet Take 1 tablet by mouth daily. 30 tablet 0   • atorvastatin (LIPITOR) 80 MG tablet Take 1 tablet by mouth daily. 30 tablet 0   • clopidogrel (Plavix) 75 MG tablet Take 1 tablet by mouth daily. 30 tablet 0   • metoPROLOL tartrate (LOPRESSOR) 25 MG tablet Take 1 tablet by mouth 2 times daily. 60 tablet 0   • thiamine (VITAMIN B1) 100 MG tablet Take 1 tablet by mouth daily. 30 tablet 0   • Multiple Vitamins-Minerals (Multivitamin Adult) Tab Take 1 tablet by mouth daily. 30 tablet 0   • acetaminophen (TYLENOL) 500 MG tablet Take 2 tablets by mouth every 6 hours as needed for Pain. 2 TABLETS(=1,000MG) 60 tablet 0   • Acetic Acid 3 % external solution Apply to dry gauze to moisten and then apply moistened gauze to leg ulcers for 15 min soak 500 mL 0   • Elastic Bandages & Supports (MEDICAL COMPRESSION STOCKINGS) Misc To left calf daily       No current facility-administered medications for this visit.      ALLERGIES:  Ciprofloxacin and Levaquin [levofloxacin]    I have reviewed the patient's past medical, surgical, social and family history, updating these as appropriate. See Histories section of the EMR (electronic medical record) for a display of this information.    Examination of his left lower extremity residual limb reveals the wound is healing well.  All of his remaining sutures are removed.  Surrounding skin soft tissues are benign.       Assessment/plan:  Doing well.  He may now get his wound wet.  He will meet the prosthetist for residual limb shrinkage and eventual fitting of a prosthesis.  He will return to my clinic on a p.r.n. basis, should further problems develop.   Alert-The patient is alert, awake and responds to voice. The patient is oriented to time, place, and person. The triage nurse is able to obtain subjective information.

## 2021-10-26 NOTE — ED ADULT NURSE REASSESSMENT NOTE - NS ED NURSE REASSESS COMMENT FT1
Patient in cart, awaiting ambulance for discharge, 1:1 for safety at bedside, patient has no labored breathing, is in no acute distress.

## 2021-10-26 NOTE — ED ADULT NURSE REASSESSMENT NOTE - NS ED NURSE REASSESS COMMENT FT1
AAOx1 received in stable condition. Patient eating comfortably with 1:1 ongoing. Blood sugar 160mg/dL. Safety ongoing

## 2021-10-26 NOTE — ED CDU PROVIDER DISPOSITION NOTE - CLINICAL COURSE
This is a 89 y/o female BIBA with a pmhx of dementia, frequent falls brought in by [facility] for fall. called over to [facility] who stated that patient was found on the ground, unwitnessed fall unsure if head injury. patient states she does not remember what happened. pt with no complaints at this time. pt was seen at [facility] one day ago for different fall. pt denies cp sob headache neck pain visual changes abd pain back pain numbness or loss of sensation nausea vomiting urinary or fecal incontinence.  Patient placed into observation for PT evaluation, cleared to return to assisted living.  Social work to arrange transportation.  Case d/w daughter.

## 2021-10-26 NOTE — ED CDU PROVIDER INITIAL DAY NOTE - OBJECTIVE STATEMENT
pt is a 89 y/o female BIBA with a pmhx of dementia, frequent falls brought in by [facility] for fall. called over to [facility] who stated that patient was found on the ground, unwitnessed fall unsure if head injury. patient states she does not remember what happened. pt with no complaints at this time. pt was seen at [facility] one day ago for different fall. pt denies cp sob headache neck pain visual changes abd pain back pain numbness or loss of sensation nausea vomiting urinary or fecal incontience

## 2021-10-26 NOTE — PROVIDER CONTACT NOTE (OTHER) - BACKGROUND
Pt is an assisted living resident, returns to [facility] s/p recent fall, now with another incident. Per CM/SW pt ambulatory with a RW at baseline. It is unclear the level of assist required or available.

## 2021-10-26 NOTE — PROVIDER CONTACT NOTE (OTHER) - ASSESSMENT
Pt alert but confused, demonstrates independent bed mobs, transfers and ambulates with the use of a RW. See PT eval for details.

## 2021-10-26 NOTE — CHART NOTE - NSCHARTNOTEFT_GEN_A_CORE
Called patient.  Boarding plan.  Will call us back.    SW Note: SW alerted pt is from [facility], medically stable to return. PT clearing pt to return to [facility], no skilled need at this time. Address on pt's facesheet is the address for [facility]. SW placed call to [facility], was made aware by  staff that there is no one in wellness at this time to discuss d/c, was trx to the director to discuss, however no response, voicemail left. SW awaiting call back

## 2021-10-26 NOTE — ED ADULT NURSE REASSESSMENT NOTE - NS ED NURSE REASSESS COMMENT FT1
received patient form inpatient nurse, daughter Diana Pickering called [phone] called asking why her mother is in Emergency Department, advised I woul dprovide with update as soon as I spoke with provider.  patient Awake and alert, confused with baseline dementia, constant observation in place, no distress.

## 2021-10-27 LAB
CULTURE RESULTS: SIGNIFICANT CHANGE UP
SPECIMEN SOURCE: SIGNIFICANT CHANGE UP

## 2021-12-07 ENCOUNTER — INPATIENT (INPATIENT)
Facility: HOSPITAL | Age: 86
LOS: 2 days | Discharge: NURSING, REHAB OR LONG-TERM CARE | DRG: 82 | End: 2021-12-10
Attending: FAMILY MEDICINE | Admitting: INTERNAL MEDICINE
Payer: MEDICARE

## 2021-12-07 VITALS
RESPIRATION RATE: 18 BRPM | SYSTOLIC BLOOD PRESSURE: 180 MMHG | OXYGEN SATURATION: 95 % | DIASTOLIC BLOOD PRESSURE: 95 MMHG | TEMPERATURE: 98 F | HEART RATE: 73 BPM

## 2021-12-07 DIAGNOSIS — E11.9 TYPE 2 DIABETES MELLITUS WITHOUT COMPLICATIONS: ICD-10-CM

## 2021-12-07 DIAGNOSIS — Z86.73 PERSONAL HISTORY OF TRANSIENT ISCHEMIC ATTACK (TIA), AND CEREBRAL INFARCTION WITHOUT RESIDUAL DEFICITS: ICD-10-CM

## 2021-12-07 DIAGNOSIS — I63.9 CEREBRAL INFARCTION, UNSPECIFIED: ICD-10-CM

## 2021-12-07 DIAGNOSIS — F03.90 UNSPECIFIED DEMENTIA, UNSPECIFIED SEVERITY, WITHOUT BEHAVIORAL DISTURBANCE, PSYCHOTIC DISTURBANCE, MOOD DISTURBANCE, AND ANXIETY: ICD-10-CM
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
## 2021-12-07 DIAGNOSIS — I10 ESSENTIAL (PRIMARY) HYPERTENSION: ICD-10-CM

## 2021-12-07 LAB
A1C WITH ESTIMATED AVERAGE GLUCOSE RESULT: 6 % — HIGH (ref 4–5.6)
ALBUMIN SERPL ELPH-MCNC: 3.8 G/DL — SIGNIFICANT CHANGE UP (ref 3.3–5.2)
ALP SERPL-CCNC: 118 U/L — SIGNIFICANT CHANGE UP (ref 40–120)
ALT FLD-CCNC: 13 U/L — SIGNIFICANT CHANGE UP
ANION GAP SERPL CALC-SCNC: 12 MMOL/L — SIGNIFICANT CHANGE UP (ref 5–17)
AST SERPL-CCNC: 20 U/L — SIGNIFICANT CHANGE UP
BASOPHILS # BLD AUTO: 0.02 K/UL — SIGNIFICANT CHANGE UP (ref 0–0.2)
BASOPHILS NFR BLD AUTO: 0.4 % — SIGNIFICANT CHANGE UP (ref 0–2)
BILIRUB SERPL-MCNC: 1 MG/DL — SIGNIFICANT CHANGE UP (ref 0.4–2)
BLD GP AB SCN SERPL QL: SIGNIFICANT CHANGE UP
BUN SERPL-MCNC: 14.9 MG/DL — SIGNIFICANT CHANGE UP (ref 8–20)
CALCIUM SERPL-MCNC: 8.7 MG/DL — SIGNIFICANT CHANGE UP (ref 8.6–10.2)
CHLORIDE SERPL-SCNC: 104 MMOL/L — SIGNIFICANT CHANGE UP (ref 98–107)
CO2 SERPL-SCNC: 26 MMOL/L — SIGNIFICANT CHANGE UP (ref 22–29)
CREAT SERPL-MCNC: 0.74 MG/DL — SIGNIFICANT CHANGE UP (ref 0.5–1.3)
EOSINOPHIL # BLD AUTO: 0.24 K/UL — SIGNIFICANT CHANGE UP (ref 0–0.5)
EOSINOPHIL NFR BLD AUTO: 5.3 % — SIGNIFICANT CHANGE UP (ref 0–6)
ESTIMATED AVERAGE GLUCOSE: 126 MG/DL — HIGH (ref 68–114)
GLUCOSE BLDC GLUCOMTR-MCNC: 121 MG/DL — HIGH (ref 70–99)
GLUCOSE SERPL-MCNC: 110 MG/DL — HIGH (ref 70–99)
HCT VFR BLD CALC: 35.5 % — SIGNIFICANT CHANGE UP (ref 34.5–45)
HGB BLD-MCNC: 11.8 G/DL — SIGNIFICANT CHANGE UP (ref 11.5–15.5)
IMM GRANULOCYTES NFR BLD AUTO: 0.2 % — SIGNIFICANT CHANGE UP (ref 0–1.5)
LYMPHOCYTES # BLD AUTO: 1.11 K/UL — SIGNIFICANT CHANGE UP (ref 1–3.3)
LYMPHOCYTES # BLD AUTO: 24.4 % — SIGNIFICANT CHANGE UP (ref 13–44)
MCHC RBC-ENTMCNC: 30.5 PG — SIGNIFICANT CHANGE UP (ref 27–34)
MCHC RBC-ENTMCNC: 33.2 GM/DL — SIGNIFICANT CHANGE UP (ref 32–36)
MCV RBC AUTO: 91.7 FL — SIGNIFICANT CHANGE UP (ref 80–100)
MONOCYTES # BLD AUTO: 0.57 K/UL — SIGNIFICANT CHANGE UP (ref 0–0.9)
MONOCYTES NFR BLD AUTO: 12.5 % — SIGNIFICANT CHANGE UP (ref 2–14)
NEUTROPHILS # BLD AUTO: 2.6 K/UL — SIGNIFICANT CHANGE UP (ref 1.8–7.4)
NEUTROPHILS NFR BLD AUTO: 57.2 % — SIGNIFICANT CHANGE UP (ref 43–77)
PLATELET # BLD AUTO: 131 K/UL — LOW (ref 150–400)
POTASSIUM SERPL-MCNC: 4.6 MMOL/L — SIGNIFICANT CHANGE UP (ref 3.5–5.3)
POTASSIUM SERPL-SCNC: 4.6 MMOL/L — SIGNIFICANT CHANGE UP (ref 3.5–5.3)
PROT SERPL-MCNC: 6.6 G/DL — SIGNIFICANT CHANGE UP (ref 6.6–8.7)
RAPID RVP RESULT: DETECTED
RBC # BLD: 3.87 M/UL — SIGNIFICANT CHANGE UP (ref 3.8–5.2)
RBC # FLD: 12.6 % — SIGNIFICANT CHANGE UP (ref 10.3–14.5)
RV+EV RNA SPEC QL NAA+PROBE: DETECTED
SARS-COV-2 RNA SPEC QL NAA+PROBE: SIGNIFICANT CHANGE UP
SODIUM SERPL-SCNC: 142 MMOL/L — SIGNIFICANT CHANGE UP (ref 135–145)
WBC # BLD: 4.55 K/UL — SIGNIFICANT CHANGE UP (ref 3.8–10.5)
WBC # FLD AUTO: 4.55 K/UL — SIGNIFICANT CHANGE UP (ref 3.8–10.5)

## 2021-12-07 PROCEDURE — 72125 CT NECK SPINE W/O DYE: CPT | Mod: 26,MA

## 2021-12-07 PROCEDURE — 99285 EMERGENCY DEPT VISIT HI MDM: CPT

## 2021-12-07 PROCEDURE — 74176 CT ABD & PELVIS W/O CONTRAST: CPT | Mod: 26,MA

## 2021-12-07 PROCEDURE — 71250 CT THORAX DX C-: CPT | Mod: 26,MA

## 2021-12-07 PROCEDURE — 99497 ADVNCD CARE PLAN 30 MIN: CPT | Mod: 25

## 2021-12-07 PROCEDURE — 93010 ELECTROCARDIOGRAM REPORT: CPT

## 2021-12-07 PROCEDURE — 70450 CT HEAD/BRAIN W/O DYE: CPT | Mod: 26,MA

## 2021-12-07 PROCEDURE — 99222 1ST HOSP IP/OBS MODERATE 55: CPT

## 2021-12-07 PROCEDURE — 99223 1ST HOSP IP/OBS HIGH 75: CPT | Mod: AI

## 2021-12-07 RX ORDER — SERTRALINE 25 MG/1
1 TABLET, FILM COATED ORAL
Qty: 0 | Refills: 0 | DISCHARGE

## 2021-12-07 RX ORDER — ASPIRIN/CALCIUM CARB/MAGNESIUM 324 MG
300 TABLET ORAL DAILY
Refills: 0 | Status: DISCONTINUED | OUTPATIENT
Start: 2021-12-07 | End: 2021-12-08

## 2021-12-07 RX ORDER — GLUCAGON INJECTION, SOLUTION 0.5 MG/.1ML
1 INJECTION, SOLUTION SUBCUTANEOUS ONCE
Refills: 0 | Status: DISCONTINUED | OUTPATIENT
Start: 2021-12-07 | End: 2021-12-10

## 2021-12-07 RX ORDER — SODIUM CHLORIDE 9 MG/ML
1000 INJECTION, SOLUTION INTRAVENOUS
Refills: 0 | Status: DISCONTINUED | OUTPATIENT
Start: 2021-12-07 | End: 2021-12-10

## 2021-12-07 RX ORDER — LABETALOL HCL 100 MG
10 TABLET ORAL EVERY 8 HOURS
Refills: 0 | Status: DISCONTINUED | OUTPATIENT
Start: 2021-12-07 | End: 2021-12-09

## 2021-12-07 RX ORDER — GLIMEPIRIDE 1 MG
1 TABLET ORAL
Qty: 0 | Refills: 0 | DISCHARGE

## 2021-12-07 RX ORDER — QUETIAPINE FUMARATE 200 MG/1
0 TABLET, FILM COATED ORAL
Qty: 0 | Refills: 0 | DISCHARGE

## 2021-12-07 RX ORDER — QUETIAPINE FUMARATE 200 MG/1
25 TABLET, FILM COATED ORAL AT BEDTIME
Refills: 0 | Status: DISCONTINUED | OUTPATIENT
Start: 2021-12-07 | End: 2021-12-10

## 2021-12-07 RX ORDER — DEXTROSE 50 % IN WATER 50 %
25 SYRINGE (ML) INTRAVENOUS ONCE
Refills: 0 | Status: DISCONTINUED | OUTPATIENT
Start: 2021-12-07 | End: 2021-12-10

## 2021-12-07 RX ORDER — ASPIRIN/CALCIUM CARB/MAGNESIUM 324 MG
1 TABLET ORAL
Qty: 0 | Refills: 0 | DISCHARGE

## 2021-12-07 RX ORDER — DEXTROSE 50 % IN WATER 50 %
12.5 SYRINGE (ML) INTRAVENOUS ONCE
Refills: 0 | Status: DISCONTINUED | OUTPATIENT
Start: 2021-12-07 | End: 2021-12-10

## 2021-12-07 RX ORDER — ASPIRIN/CALCIUM CARB/MAGNESIUM 324 MG
325 TABLET ORAL ONCE
Refills: 0 | Status: COMPLETED | OUTPATIENT
Start: 2021-12-07 | End: 2021-12-07

## 2021-12-07 RX ORDER — ACETAMINOPHEN 500 MG
2 TABLET ORAL
Qty: 0 | Refills: 0 | DISCHARGE

## 2021-12-07 RX ORDER — SERTRALINE 25 MG/1
25 TABLET, FILM COATED ORAL DAILY
Refills: 0 | Status: DISCONTINUED | OUTPATIENT
Start: 2021-12-07 | End: 2021-12-10

## 2021-12-07 RX ORDER — LABETALOL HCL 100 MG
10 TABLET ORAL ONCE
Refills: 0 | Status: COMPLETED | OUTPATIENT
Start: 2021-12-07 | End: 2021-12-07

## 2021-12-07 RX ORDER — ALBUTEROL 90 UG/1
2.5 AEROSOL, METERED ORAL EVERY 6 HOURS
Refills: 0 | Status: DISCONTINUED | OUTPATIENT
Start: 2021-12-07 | End: 2021-12-09

## 2021-12-07 RX ORDER — INSULIN LISPRO 100/ML
VIAL (ML) SUBCUTANEOUS
Refills: 0 | Status: DISCONTINUED | OUTPATIENT
Start: 2021-12-07 | End: 2021-12-09

## 2021-12-07 RX ORDER — SODIUM CHLORIDE 9 MG/ML
500 INJECTION INTRAMUSCULAR; INTRAVENOUS; SUBCUTANEOUS ONCE
Refills: 0 | Status: COMPLETED | OUTPATIENT
Start: 2021-12-07 | End: 2021-12-07

## 2021-12-07 RX ORDER — HEPARIN SODIUM 5000 [USP'U]/ML
5000 INJECTION INTRAVENOUS; SUBCUTANEOUS EVERY 8 HOURS
Refills: 0 | Status: DISCONTINUED | OUTPATIENT
Start: 2021-12-07 | End: 2021-12-10

## 2021-12-07 RX ORDER — ATORVASTATIN CALCIUM 80 MG/1
80 TABLET, FILM COATED ORAL AT BEDTIME
Refills: 0 | Status: DISCONTINUED | OUTPATIENT
Start: 2021-12-07 | End: 2021-12-07

## 2021-12-07 RX ORDER — SODIUM CHLORIDE 9 MG/ML
1000 INJECTION INTRAMUSCULAR; INTRAVENOUS; SUBCUTANEOUS
Refills: 0 | Status: DISCONTINUED | OUTPATIENT
Start: 2021-12-07 | End: 2021-12-10

## 2021-12-07 RX ORDER — ACETAMINOPHEN 500 MG
650 TABLET ORAL EVERY 6 HOURS
Refills: 0 | Status: DISCONTINUED | OUTPATIENT
Start: 2021-12-07 | End: 2021-12-10

## 2021-12-07 RX ORDER — PANTOPRAZOLE SODIUM 20 MG/1
40 TABLET, DELAYED RELEASE ORAL
Refills: 0 | Status: DISCONTINUED | OUTPATIENT
Start: 2021-12-07 | End: 2021-12-10

## 2021-12-07 RX ORDER — DEXTROSE 50 % IN WATER 50 %
15 SYRINGE (ML) INTRAVENOUS ONCE
Refills: 0 | Status: DISCONTINUED | OUTPATIENT
Start: 2021-12-07 | End: 2021-12-10

## 2021-12-07 RX ADMIN — Medication 10 MILLIGRAM(S): at 11:22

## 2021-12-07 RX ADMIN — Medication 325 MILLIGRAM(S): at 11:25

## 2021-12-07 RX ADMIN — HEPARIN SODIUM 5000 UNIT(S): 5000 INJECTION INTRAVENOUS; SUBCUTANEOUS at 23:06

## 2021-12-07 RX ADMIN — SODIUM CHLORIDE 50 MILLILITER(S): 9 INJECTION INTRAMUSCULAR; INTRAVENOUS; SUBCUTANEOUS at 23:07

## 2021-12-07 RX ADMIN — SODIUM CHLORIDE 500 MILLILITER(S): 9 INJECTION INTRAMUSCULAR; INTRAVENOUS; SUBCUTANEOUS at 11:21

## 2021-12-07 RX ADMIN — QUETIAPINE FUMARATE 25 MILLIGRAM(S): 200 TABLET, FILM COATED ORAL at 23:06

## 2021-12-07 NOTE — ED PROVIDER NOTE - ATTENDING CONTRIBUTION TO CARE
The patient seen and examined    Falls  Subacute stroke  Viral syndrome    I, Rashid Brown, performed the initial face to face bedside interview with this patient regarding history of present illness, review of symptoms and relevant past medical, social and family history.  I completed an independent physical examination.  I was the initial provider who evaluated this patient. I have signed out the follow up of any pending tests (i.e. labs, radiological studies) to the resident.  I have communicated the patient’s plan of care and disposition with the resident.

## 2021-12-07 NOTE — CONSULT NOTE ADULT - ASSESSMENT
IMPRESSION:  - Left frontal infarct  mechanism appears to be embolic either artery to artery embolism or cardiac in origin    ASSESSMENT/ PLAN:     - Admit to inpatient hospitalist service.  - Neuro checks and vital signs Q 2 hours.  - SBP goal permissive upto 220/120 for 48 hours.  - Continue ASA 81 mg PO or 300 WV QD.  - Statin - not prescribed as she cannot tolerate statin.  - Telemetry monitoring.  - CTA of head and neck ordered.  -MRI Brain with and without contrast per radiology to better define the left frontal lucency..  - Check fasting Lipid panel and HbA1c  - TTE with bubble study.  - PT OT SLP evaluation.  - SCD/ SQ Lovenox for DVT prophylaxis.

## 2021-12-07 NOTE — H&P ADULT - NSHPPHYSICALEXAM_GEN_ALL_CORE
T(C): 36.4 (12-07-21 @ 07:04), Max: 36.4 (12-07-21 @ 07:04)  HR: 75 (12-07-21 @ 11:12) (73 - 75)  BP: 192/63 (12-07-21 @ 11:12) (180/95 - 192/63)  RR: 18 (12-07-21 @ 11:12) (18 - 18)  SpO2: 100% (12-07-21 @ 11:12) (95% - 100%)    GEN - NAD  HEENT - NCAT, EOMI, EVIE,   RESP - CTA BL, no wheeze/ rhonchi/crackles. not on supplemental O2.  CARDIO - NS1S2, RRR. No murmurs  ABD - Soft/Non tender/Non distended. Normal BS x4 quadrants.   Ext - No WANDA. no signsl stasis  MSK - full ROM of BL upper and lower extremities without pain or restriction. BL 5/5 strength on upper and lower extremities. B/L SCAR Hi knee. LS spine: mild discomfort upper back, rom wnl  Neuro - cn 2-12 grossly intact.  no visible seizure activity appreciated. no tremor. gait not observed.   Psych- AAOx2. attentive. normal affect.

## 2021-12-07 NOTE — ED PROVIDER NOTE - OBJECTIVE STATEMENT
The patient is a 89 year old female presents with unwitnessed fall found on the floor with history of dementia.  The patient does not remember falling.  Denies any complaints

## 2021-12-07 NOTE — H&P ADULT - ASSESSMENT
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 Ed found to have New subacute/chronic left frontal lobe infarct.    New subacute/chronic left frontal lobe infarct  -Stroke protocol  - Neurocheck  -ASA. hold statin as pt is intolerance of statin in past.  - ECho,CUS,lipid panel,A1C  - EKG  -Swallow eval, PT eval  -Spoke with neurologist.    Frequent fall  -fall precaution  -PT EVAL    DM type 2  -a1c  -ISSC      HTN  - high   -will t meds if sbp >200 PER neurology  -not on any home meds.     Lung Nodule  -Per Daughter. Following out pt.       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 Ed found to have New subacute/chronic left frontal lobe infarct.    New subacute/chronic left frontal lobe infarct  -Stroke protocol  - Neurocheck  -ASA. hold statin as pt is intolerance of statin in past.  - ECho,CUS,lipid panel,A1C  - EKG  -Swallow eval, PT eval  -Spoke with neurologist.    Frequent fall  -fall precaution  -PT EVAL    Viral illness  -Positive RVP,Enterovirus  -No acute symptoms present  -Supportive care  -prn albuterol for sob  -prn Tylenol for fever.    DM type 2  -a1c  -[facility]      HTN  - high   - will start meds if sbp >200 PER neurology  - not on any home meds.     Lung Nodule  -Per Daughter. Following out pt.     Dementia with behavior disorder   - Was on Aricept in past but DC by pcp  - continue Seroquel sertraline      Goal of care DW daughter-dnr/dni, MOLTS on chart  care of plan dw daughter in detailed.  dw ,ED Physician

## 2021-12-07 NOTE — ED ADULT TRIAGE NOTE - CHIEF COMPLAINT QUOTE
patient from [facility] s/p unwitnessed fall, patient with a history of dimentia as per EMS staff states patient is at baseline, patient with complaints of back pain which she has had before. no signs of trauma noted

## 2021-12-07 NOTE — ED PROVIDER NOTE - PROGRESS NOTE DETAILS
Reviewed all results with pt as well as plans for admission. Pt is comfortable with plan for admission. Questions answered. - Arias Virk, PGY-3

## 2021-12-07 NOTE — CHART NOTE - NSCHARTNOTEFT_GEN_A_CORE
Called by RN with pt's BP of 183/81 P 76  Pt on stroke protocol <24hrs; Informed RN about permissive BP; If SBP>200, follow labetalol prn order  Monitor and escalate for change in pt's status.

## 2021-12-07 NOTE — H&P ADULT - CONVERSATION DETAILS
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 Found to Subacute STROKE.  Daughter HCP Diana wants pt to be DNR/DNI.

## 2021-12-07 NOTE — CONSULT NOTE ADULT - SUBJECTIVE AND OBJECTIVE BOX
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 s/p unwitnessed fall.  per EMS staff states patient is at baseline, patient with complaints of back pain which she has had before. no signs of trauma noted. Pt is poor historian. HX from daughter and ED physician. Per Daughter, pt has multiple fall,last fall oct 26, 2021, work up was negative. Pt was in [facility] ED. Pt is aaox2,non compliant with her walker. Did not tolerate Statin in past. code [facility] DNR/DNI.  Currently pt is AAOX2.  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        CT Head No Cont (12.07.21 @ 08:39) >  IMPRESSION:    CT head: New subacute/chronic left frontal lobe infarct. Consider contrast-enhanced MRI for further evaluation. No acute intracranial hemorrhage. Findings discussed with Dr. Brown    CT C-spine: Moderate spondylosis. No acute fracture dislocation cervical spine. Consider MRI as clinically warranted.    CT Abdomen and Pelvis No Cont (12.07.21 @ 08:40) >  IMPRESSION: Noevidence of acute visceral organ or bony injury on this noncontrast CT. Healing bilateral rib fractures.        CT Abdomen and Pelvis No Cont (12.07.21 @ 08:40) >  CHEST:  LUNGS AND LARGE AIRWAYS: Patent central airways. Again are noted scattered pulmonary nodules measuring up to 3 mm and scatteredmucus plugging. No confluent airspace opacity. No suspicious lung mass is identified.  PLEURA: No pleural effusion. Stable mild biapical pleural thickening and parenchymal scarring. No pneumothorax or pneumomediastinum.  VESSELS: Within normal limits.  HEART: Heart size is normal. No pericardial effusion.  MEDIASTINUM AND FRANCISCO: No lymphadenopathy.  CHEST WALL AND LOWER NECK: Within normal limits.    ABDOMEN AND PELVIS:  LIVER: Within normal limits.  BILE DUCTS: Normal caliber.  GALLBLADDER: Within normal limits.  SPLEEN: Within normal limits.  PANCREAS: Within normal limits.  ADRENALS: Within normal limits.  KIDNEYS/URETERS: Within normal limits.    BLADDER: Within normal limits.  REPRODUCTIVE ORGANS: Calcified uterine fibroid. Otherwise, theuterus and adnexa within normal limits.    BOWEL: Small hiatal hernia. Sigmoid diverticulosis. No bowel obstruction. Appendix is normal.  PERITONEUM: No ascites.  VESSELS: Within normal limits.  RETROPERITONEUM/LYMPH NODES: No lymphadenopathy.  ABDOMINAL WALL: Within normal limits.  BONES: Mild anterolisthesis of L2 on L3 and L3 on L4. Healing right distal fourth, fifth, and sixth rib fractures. Healing left lateral third, fourth, fifth, sixth, seventh, and eighth rib fractures.    IMPRESSION: Noevidence of acute visceral organ or bony injury on this noncontrast CT. Healing bilateral rib fractures.    < end of copied text >   (07 Dec 2021 13:15)    PMH: Dementia    DM (diabetes mellitus)         PSH:       FAMILY HISTORY:      SOCIAL HISTORY:  No history of tobacco or alcohol use     Allergies    simvastatin (Muscle Pain)    Intolerances            Vital Signs Last 24 Hrs  T(C): 36.4 (07 Dec 2021 07:04), Max: 36.4 (07 Dec 2021 07:04)  T(F): 97.5 (07 Dec 2021 07:04), Max: 97.5 (07 Dec 2021 07:04)  HR: 75 (07 Dec 2021 11:12) (73 - 75)  BP: 192/63 (07 Dec 2021 11:12) (180/95 - 192/63)  BP(mean): --  RR: 18 (07 Dec 2021 11:12) (18 - 18)  SpO2: 100% (07 Dec 2021 11:12) (95% - 100%)  MEDICATIONS    aspirin Suppository 300 milliGRAM(s) Rectal daily  dextrose 40% Gel 15 Gram(s) Oral once  dextrose 5%. 1000 milliLiter(s) IV Continuous <Continuous>  dextrose 5%. 1000 milliLiter(s) IV Continuous <Continuous>  dextrose 50% Injectable 25 Gram(s) IV Push once  dextrose 50% Injectable 12.5 Gram(s) IV Push once  dextrose 50% Injectable 25 Gram(s) IV Push once  glucagon  Injectable 1 milliGRAM(s) IntraMuscular once  heparin   Injectable 5000 Unit(s) SubCutaneous every 8 hours  insulin lispro (ADMELOG) corrective regimen sliding scale   SubCutaneous every 3 hours  labetalol Injectable 10 milliGRAM(s) IV Push every 8 hours PRN  pantoprazole    Tablet 40 milliGRAM(s) Oral before breakfast  QUEtiapine 25 milliGRAM(s) Oral at bedtime  sertraline 25 milliGRAM(s) Oral daily  sodium chloride 0.9%. 1000 milliLiter(s) IV Continuous <Continuous>        LABS:  CBC Full  -  ( 07 Dec 2021 08:23 )  WBC Count : 4.55 K/uL  RBC Count : 3.87 M/uL  Hemoglobin : 11.8 g/dL  Hematocrit : 35.5 %  Platelet Count - Automated : 131 K/uL  Mean Cell Volume : 91.7 fl  Mean Cell Hemoglobin : 30.5 pg  Mean Cell Hemoglobin Concentration : 33.2 gm/dL  Auto Neutrophil # : 2.60 K/uL  Auto Lymphocyte # : 1.11 K/uL  Auto Monocyte # : 0.57 K/uL  Auto Eosinophil # : 0.24 K/uL  Auto Basophil # : 0.02 K/uL  Auto Neutrophil % : 57.2 %  Auto Lymphocyte % : 24.4 %  Auto Monocyte % : 12.5 %  Auto Eosinophil % : 5.3 %  Auto Basophil % : 0.4 %      12-07    142  |  104  |  14.9  ----------------------------<  110<H>  4.6   |  26.0  |  0.74    Ca    8.7      07 Dec 2021 08:23    TPro  6.6  /  Alb  3.8  /  TBili  1.0  /  DBili  x   /  AST  20  /  ALT  13  /  AlkPhos  118  12-07    LIVER FUNCTIONS - ( 07 Dec 2021 08:23 )  Alb: 3.8 g/dL / Pro: 6.6 g/dL / ALK PHOS: 118 U/L / ALT: 13 U/L / AST: 20 U/L / GGT: x           Hemoglobin A1C:             On Neurological Examination:    Mental Status - Patient is alert, awake, oriented X2. fluent, names and repeats correctly  There is no dysarthria, no aphasia, follows commands well and able to answer questions appropriately. Mood and affect  normal    Cranial Nerves - PERRL, EOMI,  Visual fields are full to finger counting, no gross facial asymmetry, tongue/uvula midline    Motor Exam -   Right upper ---4+/5 with slight drift and slow FFM  Left upper ---5/5 No drift  Right lower ---5/5 No drift  Left lower  ---5/5 No drift     nml bulk/tone    Sensory    Intact to light touch and pinprick bilaterally    Coord: FTN intact bilaterally     Gait -  Not assessed.                  [facility] SS:   Date: 12-7-2021  Time: 1250  1a) Level of consciousness (0-3):   1b) Questions (0-2):   1c) Commands (0-2):   2  ) Gaze (0-2):   3  ) Visual field (0-3):   4  ) Facial palsy (0-3):   Motor  5a) Left arm (0-4):  1  5b) Right arm (0-4):   6a) Left leg (0-4):   6b) Right leg (0-4):   7  ) Ataxia (0-2):   Sensory  8  ) Sensory (0-2):   Speech  9  ) Language (0-3):   10) Dysarthria (0-2):   Extinction  11) Extinction/inattention (0-2):     Total score: =1    Prestroke Modified Mamie: =3              RADIOLOGY  [facility]     < from: CT Head No Cont (12.07.21 @ 08:39) >  IMPRESSION:    CT head: New subacute/chronic left frontal lobe infarct. Consider contrast-enhanced MRI for further evaluation. No acute intracranial hemorrhage. Findings discussed with Dr. Brown    CT C-spine: Moderate spondylosis. No acute fracture dislocation cervical spine. Consider MRI as clinically warranted.    < end of copied text >    CTA  CTP  MRI:  TTE

## 2021-12-07 NOTE — H&P ADULT - HISTORY OF PRESENT ILLNESS
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  per EMS staff states patient is at baseline, patient with complaints of back pain which she has had before. no signs of trauma noted. Pt is poor historian. HX from daughter and ED physician. Per Daughter, pt has multiple fall,last fall oct 26, 2021, work up was negative. Pt was in [facility] ED. Pt is aaox2,non compliant with her walker. Did not tolerate Statin in past. code staus DNR/DNI.  Currently pt is AAOX2. Denies any neck pain,back pain,speech clear,no dizziness,weaknes,numbness.    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        < from: CT Head No Cont (12.07.21 @ 08:39) >  IMPRESSION:    CT head: New subacute/chronic left frontal lobe infarct. Consider contrast-enhanced MRI for further evaluation. No acute intracranial hemorrhage. Findings discussed with Dr. Brown    CT C-spine: Moderate spondylosis. No acute fracture dislocation cervical spine. Consider MRI as clinically warranted.    < end of copied text >  < from: CT Abdomen and Pelvis No Cont (12.07.21 @ 08:40) >  IMPRESSION: Noevidence of acute visceral organ or bony injury on this noncontrast CT. Healing bilateral rib fractures.    < end of copied text >    < from: CT Abdomen and Pelvis No Cont (12.07.21 @ 08:40) >  CHEST:  LUNGS AND LARGE AIRWAYS: Patent central airways. Again are noted scattered pulmonary nodules measuring up to 3 mm and scatteredmucus plugging. No confluent airspace opacity. No suspicious lung mass is identified.  PLEURA: No pleural effusion. Stable mild biapical pleural thickening and parenchymal scarring. No pneumothorax or pneumomediastinum.  VESSELS: Within normal limits.  HEART: Heart size is normal. No pericardial effusion.  MEDIASTINUM AND FRANCISCO: No lymphadenopathy.  CHEST WALL AND LOWER NECK: Within normal limits.    ABDOMEN AND PELVIS:  LIVER: Within normal limits.  BILE DUCTS: Normal caliber.  GALLBLADDER: Within normal limits.  SPLEEN: Within normal limits.  PANCREAS: Within normal limits.  ADRENALS: Within normal limits.  KIDNEYS/URETERS: Within normal limits.    BLADDER: Within normal limits.  REPRODUCTIVE ORGANS: Calcified uterine fibroid. Otherwise, theuterus and adnexa within normal limits.    BOWEL: Small hiatal hernia. Sigmoid diverticulosis. No bowel obstruction. Appendix is normal.  PERITONEUM: No ascites.  VESSELS: Within normal limits.  RETROPERITONEUM/LYMPH NODES: No lymphadenopathy.  ABDOMINAL WALL: Within normal limits.  BONES: Mild anterolisthesis of L2 on L3 and L3 on L4. Healing right distal fourth, fifth, and sixth rib fractures. Healing left lateral third, fourth, fifth, sixth, seventh, and eighth rib fractures.    IMPRESSION: Noevidence of acute visceral organ or bony injury on this noncontrast CT. Healing bilateral rib fractures.    < end of copied text >

## 2021-12-07 NOTE — ED ADULT NURSE NOTE - CHIEF COMPLAINT QUOTE
patient from [facility] s/p unwitnessed fall, patient with a history of dimentia as per EMS staff states patient is at baseline, patient with complaints of back pain which she has had before. no signs of trauma noted

## 2021-12-08 LAB
A1C WITH ESTIMATED AVERAGE GLUCOSE RESULT: 6 % — HIGH (ref 4–5.6)
ANION GAP SERPL CALC-SCNC: 14 MMOL/L — SIGNIFICANT CHANGE UP (ref 5–17)
BUN SERPL-MCNC: 9.3 MG/DL — SIGNIFICANT CHANGE UP (ref 8–20)
CALCIUM SERPL-MCNC: 8.7 MG/DL — SIGNIFICANT CHANGE UP (ref 8.6–10.2)
CHLORIDE SERPL-SCNC: 100 MMOL/L — SIGNIFICANT CHANGE UP (ref 98–107)
CHOLEST SERPL-MCNC: 168 MG/DL — SIGNIFICANT CHANGE UP
CO2 SERPL-SCNC: 24 MMOL/L — SIGNIFICANT CHANGE UP (ref 22–29)
CREAT SERPL-MCNC: 0.61 MG/DL — SIGNIFICANT CHANGE UP (ref 0.5–1.3)
ESTIMATED AVERAGE GLUCOSE: 126 MG/DL — HIGH (ref 68–114)
GLUCOSE BLDC GLUCOMTR-MCNC: 102 MG/DL — HIGH (ref 70–99)
GLUCOSE BLDC GLUCOMTR-MCNC: 105 MG/DL — HIGH (ref 70–99)
GLUCOSE BLDC GLUCOMTR-MCNC: 108 MG/DL — HIGH (ref 70–99)
GLUCOSE BLDC GLUCOMTR-MCNC: 111 MG/DL — HIGH (ref 70–99)
GLUCOSE SERPL-MCNC: 105 MG/DL — HIGH (ref 70–99)
HDLC SERPL-MCNC: 63 MG/DL — SIGNIFICANT CHANGE UP
LIPID PNL WITH DIRECT LDL SERPL: 89 MG/DL — SIGNIFICANT CHANGE UP
NON HDL CHOLESTEROL: 105 MG/DL — SIGNIFICANT CHANGE UP
POTASSIUM SERPL-MCNC: 4.2 MMOL/L — SIGNIFICANT CHANGE UP (ref 3.5–5.3)
POTASSIUM SERPL-SCNC: 4.2 MMOL/L — SIGNIFICANT CHANGE UP (ref 3.5–5.3)
SODIUM SERPL-SCNC: 138 MMOL/L — SIGNIFICANT CHANGE UP (ref 135–145)
TRIGL SERPL-MCNC: 78 MG/DL — SIGNIFICANT CHANGE UP

## 2021-12-08 PROCEDURE — 70496 CT ANGIOGRAPHY HEAD: CPT | Mod: 26

## 2021-12-08 PROCEDURE — 93306 TTE W/DOPPLER COMPLETE: CPT | Mod: 26

## 2021-12-08 PROCEDURE — 93880 EXTRACRANIAL BILAT STUDY: CPT | Mod: 26

## 2021-12-08 PROCEDURE — 99233 SBSQ HOSP IP/OBS HIGH 50: CPT

## 2021-12-08 PROCEDURE — 70498 CT ANGIOGRAPHY NECK: CPT | Mod: 26

## 2021-12-08 PROCEDURE — 99232 SBSQ HOSP IP/OBS MODERATE 35: CPT

## 2021-12-08 RX ORDER — ASPIRIN/CALCIUM CARB/MAGNESIUM 324 MG
81 TABLET ORAL DAILY
Refills: 0 | Status: DISCONTINUED | OUTPATIENT
Start: 2021-12-08 | End: 2021-12-10

## 2021-12-08 RX ADMIN — QUETIAPINE FUMARATE 25 MILLIGRAM(S): 200 TABLET, FILM COATED ORAL at 22:15

## 2021-12-08 RX ADMIN — HEPARIN SODIUM 5000 UNIT(S): 5000 INJECTION INTRAVENOUS; SUBCUTANEOUS at 13:41

## 2021-12-08 RX ADMIN — ALBUTEROL 2.5 MILLIGRAM(S): 90 AEROSOL, METERED ORAL at 10:25

## 2021-12-08 RX ADMIN — SERTRALINE 25 MILLIGRAM(S): 25 TABLET, FILM COATED ORAL at 17:59

## 2021-12-08 RX ADMIN — PANTOPRAZOLE SODIUM 40 MILLIGRAM(S): 20 TABLET, DELAYED RELEASE ORAL at 06:17

## 2021-12-08 RX ADMIN — SODIUM CHLORIDE 50 MILLILITER(S): 9 INJECTION INTRAMUSCULAR; INTRAVENOUS; SUBCUTANEOUS at 22:15

## 2021-12-08 RX ADMIN — HEPARIN SODIUM 5000 UNIT(S): 5000 INJECTION INTRAVENOUS; SUBCUTANEOUS at 05:35

## 2021-12-08 RX ADMIN — ALBUTEROL 2.5 MILLIGRAM(S): 90 AEROSOL, METERED ORAL at 15:06

## 2021-12-08 RX ADMIN — Medication 300 MILLIGRAM(S): at 13:41

## 2021-12-08 RX ADMIN — HEPARIN SODIUM 5000 UNIT(S): 5000 INJECTION INTRAVENOUS; SUBCUTANEOUS at 22:16

## 2021-12-08 NOTE — CHART NOTE - NSCHARTNOTEFT_GEN_A_CORE
Called by CT tech for missing CTA consent.  Pt is disoriented; unable to reach daughter/son; left message with back number.  Reviewed chart, pt been scoring NIHSS @3 for last 3 scores, ED provider scored @ 4.  Pt w/ subacute/ chronic left front lobe infarct. Ordered CTA of H/neck by Neurologist to r/o vascular abnormality.  Pt w/o documented contrast allergies.  BUN/Cr 14.9/0.74. Will continue with ordered test due to medical necessity.  CT tech/RN made aware. Called by CT tech for missing CTA consent.  Pt is disoriented; unable to reach daughter/son; left message with call back number.  Reviewed chart, pt been scoring NIHSS @3 for last 3 scores, ED provider scored @ 4.  Pt w/ subacute/ chronic left front lobe infarct. Ordered CTA of H/neck by Neurologist to r/o vascular abnormality.  Pt w/o documented contrast allergies.  BUN/Cr 14.9/0.74. Will continue with ordered test due to medical necessity.  CT tech/RN made aware.

## 2021-12-08 NOTE — OCCUPATIONAL THERAPY INITIAL EVALUATION ADULT - LEVEL OF INDEPENDENCE: GROOMING, OT EVAL
secondary to decreased ROM/minimum assist (75% patients effort)/moderate assist (50% patients effort)

## 2021-12-08 NOTE — PHYSICAL THERAPY INITIAL EVALUATION ADULT - IMPAIRMENTS FOUND, PT EVAL
aerobic capacity/endurance/cognitive impairment/fine motor/gait, locomotion, and balance/muscle strength/ROM

## 2021-12-08 NOTE — OCCUPATIONAL THERAPY INITIAL EVALUATION ADULT - PLANNED THERAPY INTERVENTIONS, OT EVAL
ADL retraining/balance training/bed mobility training/cognitive, visual perceptual/motor coordination training/neuromuscular re-education/ROM/strengthening/transfer training

## 2021-12-08 NOTE — PROGRESS NOTE ADULT - ASSESSMENT
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 Ed found to have New subacute/chronic left frontal lobe infarct.    New subacute/chronic left frontal lobe infarct  -Stroke protocol  - Neurocheck  -ASA. hold statin as pt is intolerance of statin in past.  - ECho,CUS,lipid panel,A1C  - EKG  -Swallow eval, PT eval  -Spoke with neurologist.    Frequent fall  -fall precaution  -PT EVAL    Viral illness  -Positive RVP,Enterovirus  -No acute symptoms present  -Supportive care  -prn albuterol for sob  -prn Tylenol for fever.    DM type 2  -a1c  -[facility]      HTN  - high   - will start meds if sbp >200 PER neurology  - not on any home meds.     Lung Nodule  -Per Daughter. Following out pt.     Dementia with behavior disorder   - Was on Aricept in past but DC by pcp  - continue Seroquel sertraline      Goal of care DW daughter-dnr/dni, MOLTS on chart  care of plan dw daughter in detailed.  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 Ed found to have New subacute/chronic left frontal lobe infarct.    New subacute/chronic left frontal lobe infarct  -Repeat ct braib/cta HEAD.NECK Similar-appearing acute left anterolateral frontal infarct.Similar subtle regions of increased attenuation within the region of   infarction may represent spared gyri or petechial hemorrhagic   transformation.  -Reviwed imaging with . Rec to continue ASA. MRI brain. No bp meds rec now.  -Called daughter Diana multiple times for MRI check list and w/update. unable to reach.  -Stroke protocol  - Neurocheck  -ASA. hold statin as pt is intolerance of statin in past.  - ECho,CUS,  -LDL 89,A1C 6.0  -Swallow eval, PT eval  -Spoke with neurologist.    Dysphagia  -Pt failed dysphagia screen. swallow eval  -npo for now      Frequent fall  -fall precaution  -PT EVAL    Viral illness  -Positive RVP,Enterovirus  -No acute symptoms present  -Supportive care  -prn albuterol for sob  -prn Tylenol for fever.    DM type 2  -a1c 6.0  -[facility]      HTN  - high   - will start meds if sbp >180 PER neurology  - not on any home meds.     Lung Nodule  -Per Daughter. Following out pt.     Dementia with behavior disorder   - Was on Aricept in past but DC by pcp  - continue Seroquel sertraline      Goal of care DW daughter-dnr/dni, MOLTS on chart  Called  daughter unable to recah.[phone]  dw ,

## 2021-12-08 NOTE — PHYSICAL THERAPY INITIAL EVALUATION ADULT - ADDITIONAL COMMENTS
As per chart, pt resides at [facility] and uses RW. Pt is non-compliant with walker and has had recent falls.

## 2021-12-08 NOTE — PHYSICAL THERAPY INITIAL EVALUATION ADULT - PERTINENT HX OF CURRENT PROBLEM, REHAB EVAL
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 Ed found to have New subacute/chronic left frontal lobe infarct.

## 2021-12-08 NOTE — OCCUPATIONAL THERAPY INITIAL EVALUATION ADULT - ADDITIONAL COMMENTS
Pt is a poor historian due to difficulty communication  Per chart, pt is a resident of the [facility] and has been non-compliant with use of walker

## 2021-12-08 NOTE — CHART NOTE - NSCHARTNOTEFT_GEN_A_CORE
RN called to verify orders regarding acu checks and vital signs.   Chart reviewed and per neurology consult patient is to have q2hour vitals and neuro checks, patient is NPO with q 4 blood glucose checks.  Orders placed for patient to be upgraded to stepdown for frequency of vitals and neuro.

## 2021-12-08 NOTE — PROGRESS NOTE ADULT - ASSESSMENT
IMPRESSION:  - Left frontal infarct  mechanism appears to be embolic either artery to artery embolism or cardiac in origin    ASSESSMENT/ PLAN:     - Admit to inpatient hospitalist service.  - Neuro checks and vital signs Q 2 hours.  - SBP goal permissive upto 180/100 for 24 hours the normotensive after 12-9-21.  - Continue ASA 81 mg PO or 300 AK QD.  - Statin - not prescribed as she cannot tolerate statin.  - Telemetry monitoring.  - CTA of head and neck report reviewed  -MRI Brain with and without contrast per radiology to better define the left frontal lucency..  - Check fasting Lipid panel and HbA1c  - TTE with bubble study.  - PT OT SLP evaluation.  - SCD/ SQ Lovenox for DVT prophylaxis.

## 2021-12-09 ENCOUNTER — TRANSCRIPTION ENCOUNTER (OUTPATIENT)
Age: 86
End: 2021-12-09

## 2021-12-09 LAB
ANION GAP SERPL CALC-SCNC: 17 MMOL/L — SIGNIFICANT CHANGE UP (ref 5–17)
BUN SERPL-MCNC: 11.4 MG/DL — SIGNIFICANT CHANGE UP (ref 8–20)
CALCIUM SERPL-MCNC: 8.9 MG/DL — SIGNIFICANT CHANGE UP (ref 8.6–10.2)
CHLORIDE SERPL-SCNC: 102 MMOL/L — SIGNIFICANT CHANGE UP (ref 98–107)
CO2 SERPL-SCNC: 22 MMOL/L — SIGNIFICANT CHANGE UP (ref 22–29)
CREAT SERPL-MCNC: 0.67 MG/DL — SIGNIFICANT CHANGE UP (ref 0.5–1.3)
GLUCOSE BLDC GLUCOMTR-MCNC: 103 MG/DL — HIGH (ref 70–99)
GLUCOSE BLDC GLUCOMTR-MCNC: 115 MG/DL — HIGH (ref 70–99)
GLUCOSE BLDC GLUCOMTR-MCNC: 146 MG/DL — HIGH (ref 70–99)
GLUCOSE BLDC GLUCOMTR-MCNC: 89 MG/DL — SIGNIFICANT CHANGE UP (ref 70–99)
GLUCOSE BLDC GLUCOMTR-MCNC: 97 MG/DL — SIGNIFICANT CHANGE UP (ref 70–99)
GLUCOSE SERPL-MCNC: 92 MG/DL — SIGNIFICANT CHANGE UP (ref 70–99)
POTASSIUM SERPL-MCNC: 4.3 MMOL/L — SIGNIFICANT CHANGE UP (ref 3.5–5.3)
POTASSIUM SERPL-SCNC: 4.3 MMOL/L — SIGNIFICANT CHANGE UP (ref 3.5–5.3)
SARS-COV-2 RNA SPEC QL NAA+PROBE: SIGNIFICANT CHANGE UP
SODIUM SERPL-SCNC: 141 MMOL/L — SIGNIFICANT CHANGE UP (ref 135–145)

## 2021-12-09 PROCEDURE — 70551 MRI BRAIN STEM W/O DYE: CPT | Mod: 26

## 2021-12-09 PROCEDURE — 99231 SBSQ HOSP IP/OBS SF/LOW 25: CPT

## 2021-12-09 PROCEDURE — 99232 SBSQ HOSP IP/OBS MODERATE 35: CPT

## 2021-12-09 RX ORDER — LABETALOL HCL 100 MG
10 TABLET ORAL EVERY 6 HOURS
Refills: 0 | Status: DISCONTINUED | OUTPATIENT
Start: 2021-12-09 | End: 2021-12-09

## 2021-12-09 RX ORDER — LABETALOL HCL 100 MG
5 TABLET ORAL EVERY 6 HOURS
Refills: 0 | Status: DISCONTINUED | OUTPATIENT
Start: 2021-12-09 | End: 2021-12-10

## 2021-12-09 RX ORDER — INSULIN LISPRO 100/ML
VIAL (ML) SUBCUTANEOUS
Refills: 0 | Status: DISCONTINUED | OUTPATIENT
Start: 2021-12-09 | End: 2021-12-10

## 2021-12-09 RX ORDER — INSULIN LISPRO 100/ML
VIAL (ML) SUBCUTANEOUS AT BEDTIME
Refills: 0 | Status: DISCONTINUED | OUTPATIENT
Start: 2021-12-09 | End: 2021-12-10

## 2021-12-09 RX ADMIN — SERTRALINE 25 MILLIGRAM(S): 25 TABLET, FILM COATED ORAL at 11:48

## 2021-12-09 RX ADMIN — QUETIAPINE FUMARATE 25 MILLIGRAM(S): 200 TABLET, FILM COATED ORAL at 22:05

## 2021-12-09 RX ADMIN — HEPARIN SODIUM 5000 UNIT(S): 5000 INJECTION INTRAVENOUS; SUBCUTANEOUS at 13:42

## 2021-12-09 RX ADMIN — Medication 81 MILLIGRAM(S): at 11:48

## 2021-12-09 RX ADMIN — HEPARIN SODIUM 5000 UNIT(S): 5000 INJECTION INTRAVENOUS; SUBCUTANEOUS at 05:02

## 2021-12-09 RX ADMIN — Medication 5 MILLIGRAM(S): at 10:57

## 2021-12-09 RX ADMIN — HEPARIN SODIUM 5000 UNIT(S): 5000 INJECTION INTRAVENOUS; SUBCUTANEOUS at 22:05

## 2021-12-09 RX ADMIN — ALBUTEROL 2.5 MILLIGRAM(S): 90 AEROSOL, METERED ORAL at 10:55

## 2021-12-09 NOTE — SWALLOW BEDSIDE ASSESSMENT ADULT - SWALLOW EVAL: DIAGNOSIS
Oral stage negatively impacted by edentulous dentition, suspect posterior loss with thin liquids.  Suspect pharyngeal dysphagia with thin liquids due to +wet vocal quality post swallow.  No overt s/s aspiration after foods or mildly thick liquids.

## 2021-12-09 NOTE — PROGRESS NOTE ADULT - ASSESSMENT
IMPRESSION:  - Left frontal infarct  mechanism appears to be embolic either artery to artery embolism or cardiac in origin    ASSESSMENT/ PLAN:     - Neuro checks and vital signs Q 4 hours.  - SBP goal  normotensive after 12-9-21.  - Continue ASA 81 mg PO QD.  - Statin - not prescribed as she cannot tolerate statin.  - Telemetry monitoring.  - CTA of head and neck report reviewed  - MRI Brain with and without contrast per radiology to better define the left frontal lucency..  - Check fasting Lipid panel and HbA1c  - TTE report as above reviewed.  - PT OT SLP evaluation.  - SCD/ SQ Heparin for DVT prophylaxis.

## 2021-12-09 NOTE — SWALLOW BEDSIDE ASSESSMENT ADULT - SLP GENERAL OBSERVATIONS
Pt received in ED on stretcher A&A Ox1, reduced cognition, +hoarse vocal quality, pain scale 0/10 pre & post eval

## 2021-12-09 NOTE — PROGRESS NOTE ADULT - ASSESSMENT
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 Ed found to have New subacute/chronic left frontal lobe infarct.    New subacute/chronic left frontal lobe infarct  -Repeat ct braib/cta HEAD.NECK Similar-appearing acute left anterolateral frontal infarct.Similar subtle regions of increased attenuation within the region of   infarction may represent spared gyri or petechial hemorrhagic   transformation.  -neurology  is following. Rec to continue ASA. MRI brain.   -Called daughter Diana ervin to order  MRI  brain  -Stroke protocol  - Neurocheck  -ASA. hold statin as pt is intolerance of statin in past.  - ECho lv ef 60-65% stbale  -LDL 89,A1C 6.0    Dysphagia sec above  -Pureed w/mild thick liquid   -Aspiration precaution      Dysphagia  -Pt failed dysphagia screen. swallow eval  -npo for now      Frequent fall  -fall precaution  -PT EVAL rec YOLANDA    Viral illness  -Positive RVP,Enterovirus  -No acute symptoms present  -Supportive care  -prn albuterol for sob  -prn Tylenol for fever.    DM type 2  -a1c 6.0  -[facility]      HTN  - high AM  - will start meds if sbp >180 PER neurology  - not on any home meds.     Lung Nodule  -Per Daughter. Following out pt.     Dementia with behavior disorder   -PER DAUGHTER PT IS IN HER BASELINE NOW  - Was on Aricept in past but DC by pcp  - continue Seroquel sertraline      Goal of care DW daughter-dnr/dni, MOLTS on chart  Called  daughter .[phone],([phone]) DW ABOVE CARE OF PLAN,PENDING mri BRAIN .DC PLAN TO yolanda After MRI brain  daughter agreed, dw with SW   DC PLANNING 24 HR

## 2021-12-09 NOTE — SWALLOW BEDSIDE ASSESSMENT ADULT - COMMENTS
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 Ed found to have New subacute/chronic left frontal lobe infarct."

## 2021-12-09 NOTE — DISCHARGE NOTE PROVIDER - NSDCCPCAREPLAN_GEN_ALL_CORE_FT
PRINCIPAL DISCHARGE DIAGNOSIS  Diagnosis: CVA (cerebrovascular accident)  Assessment and Plan of Treatment:       SECONDARY DISCHARGE DIAGNOSES  Diagnosis: Fall  Assessment and Plan of Treatment:     Diagnosis: HTN (hypertension)  Assessment and Plan of Treatment:     Diagnosis: Fall  Assessment and Plan of Treatment:     Diagnosis: DM (diabetes mellitus)  Assessment and Plan of Treatment:     Diagnosis: Dementia  Assessment and Plan of Treatment:     Diagnosis: Hemorrhage in the brain  Assessment and Plan of Treatment:      PRINCIPAL DISCHARGE DIAGNOSIS  Diagnosis: Altered mental status  Assessment and Plan of Treatment:       SECONDARY DISCHARGE DIAGNOSES  Diagnosis: Fall  Assessment and Plan of Treatment:     Diagnosis: HTN (hypertension)  Assessment and Plan of Treatment:     Diagnosis: Fall  Assessment and Plan of Treatment:     Diagnosis: DM (diabetes mellitus)  Assessment and Plan of Treatment:     Diagnosis: Dementia  Assessment and Plan of Treatment:     Diagnosis: Hemorrhage in the brain  Assessment and Plan of Treatment:

## 2021-12-09 NOTE — DISCHARGE NOTE PROVIDER - HOSPITAL COURSE
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 Ed found to have New subacute/chronic left frontal lobe infarct.    New subacute/chronic left frontal lobe infarct  -Repeat ct braib/cta HEAD.NECK Similar-appearing acute left anterolateral frontal infarct.Similar subtle regions of increased attenuation within the region of   infarction may represent spared gyri or petechial hemorrhagic   transformation.  -neurology  is following. Rec to continue ASA. MRI brain.   -Called daughter Diana ervin to order  MRI  brain  -Stroke protocol  - Neurocheck  -ASA. hold statin as pt is intolerance of statin in past.  - ECho lv ef 60-65% stbale  -LDL 89,A1C 6.0    Dysphagia sec above  -Pureed w/mild thick liquid   -Aspiration precaution      Dysphagia  -Pt failed dysphagia screen. swallow eval  -npo for now      Frequent fall  -fall precaution  -PT EVAL rec YOLANDA    Viral illness  -Positive RVP,Enterovirus  -No acute symptoms present  -Supportive care  -prn albuterol for sob  -prn Tylenol for fever.    DM type 2  -a1c 6.0  -[facility]      HTN  - high AM  - will start meds if sbp >180 PER neurology  - not on any home meds.     Lung Nodule  -Per Daughter. Following out pt.     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 Ed found to have New subacute/chronic left frontal lobe infarct. MRI brain 12/09 :   2.5 x 2.7 cm well demarcated region of T1 and T2 hyperintense signal in   the left anterolateral frontal lobe. The region demonstrates restricted diffusion andsome susceptibility artifact.Findings are consistent with the presence of late subacute hemorrhage.  Repeat ct braib/cta HEAD.NECK Similar-appearing acute left anterolateral frontal infarct.Similar subtle regions of increased attenuation within the region of   infarction may represent spared gyri or petechial hemorrhagic transformation. Neurology  is following. Rec to continue ASA. Pt is intolerance of statin. ECho lv ef 60-65% stbale.LDL 89,A1C 6.0  Pureed w/mild thick liquid for dysphagia. Aspiration precaution. hx Frequent fall. fall precaution. Positive RVP,Enterovirus. No acute symptoms present.Supportive care  HX Of Lung Nodule. Per Daughter. Following out pt.     Disposition: [facility]

## 2021-12-09 NOTE — DISCHARGE NOTE PROVIDER - NSDCMRMEDTOKEN_GEN_ALL_CORE_FT
aspirin 81 mg oral delayed release tablet: 1 tab(s) orally once a day  glimepiride 1 mg oral tablet: 1 tab(s) orally once a day  QUEtiapine 25 mg oral tablet: orally once a day (at bedtime)  sertraline 25 mg oral tablet: 1 tab(s) orally once a day  Tylenol 325 mg oral tablet: 2 tab(s) orally every 8 hours, As Needed  pain

## 2021-12-09 NOTE — DISCHARGE NOTE PROVIDER - CARE PROVIDER_API CALL
Tony Payne; PhD)  Neurology; Vascular Neurology  [address]  [place] [address]  Phone: [phone]  Fax: [phone]  Follow Up Time: 1 week

## 2021-12-10 ENCOUNTER — TRANSCRIPTION ENCOUNTER (OUTPATIENT)
Age: 86
End: 2021-12-10

## 2021-12-10 VITALS
HEART RATE: 88 BPM | RESPIRATION RATE: 18 BRPM | TEMPERATURE: 98 F | SYSTOLIC BLOOD PRESSURE: 156 MMHG | OXYGEN SATURATION: 94 % | DIASTOLIC BLOOD PRESSURE: 70 MMHG

## 2021-12-10 LAB
GLUCOSE BLDC GLUCOMTR-MCNC: 118 MG/DL — HIGH (ref 70–99)
GLUCOSE BLDC GLUCOMTR-MCNC: 226 MG/DL — HIGH (ref 70–99)
GLUCOSE BLDC GLUCOMTR-MCNC: 98 MG/DL — SIGNIFICANT CHANGE UP (ref 70–99)

## 2021-12-10 PROCEDURE — 99223 1ST HOSP IP/OBS HIGH 75: CPT | Mod: GC

## 2021-12-10 PROCEDURE — 99231 SBSQ HOSP IP/OBS SF/LOW 25: CPT

## 2021-12-10 PROCEDURE — 99239 HOSP IP/OBS DSCHRG MGMT >30: CPT

## 2021-12-10 RX ADMIN — PANTOPRAZOLE SODIUM 40 MILLIGRAM(S): 20 TABLET, DELAYED RELEASE ORAL at 05:31

## 2021-12-10 RX ADMIN — SERTRALINE 25 MILLIGRAM(S): 25 TABLET, FILM COATED ORAL at 11:57

## 2021-12-10 RX ADMIN — Medication 2: at 13:36

## 2021-12-10 RX ADMIN — HEPARIN SODIUM 5000 UNIT(S): 5000 INJECTION INTRAVENOUS; SUBCUTANEOUS at 13:37

## 2021-12-10 RX ADMIN — Medication 81 MILLIGRAM(S): at 11:56

## 2021-12-10 RX ADMIN — HEPARIN SODIUM 5000 UNIT(S): 5000 INJECTION INTRAVENOUS; SUBCUTANEOUS at 05:31

## 2021-12-10 NOTE — DISCHARGE NOTE NURSING/CASE MANAGEMENT/SOCIAL WORK - PATIENT PORTAL LINK FT
You can access the FollowMyHealth Patient Portal offered by [facility] by registering at the following website: [email or web address]. By joining [employer]’s FollowMyHealth portal, you will also be able to view your health information using other applications (apps) compatible with our system.

## 2021-12-10 NOTE — PROGRESS NOTE ADULT - ASSESSMENT
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 Ed found to have New subacute/chronic left frontal lobe infarct.    New subacute/chronic left frontal lobe infarct w/ subacute hemorrhage    -mr brain: 12/09 -- 2.5 x 2.7 cm well demarcated region of T1 and T2 hyperintense signal in   the left anterolateral frontal lobe. The region demonstrates restricted   diffusion andsome susceptibility artifact.  Findings are consistent with the presence of late subacute hemorrhage.  -Repeat ct braib/cta HEAD.NECK Similar-appearing acute left anterolateral frontal infarct.Similar subtle regions of increased attenuation within the region of   infarction may represent spared gyri or petechial hemorrhagic   transformation.  -neurology  is following. Rec to continue ASA. no need neurosurgery consult. clear to discharge patient    - pt is intolerance of statin in past.  - ECho lv ef 60-65% stbale  -LDL 89,A1C 6.0    Dysphagia sec above  -Pureed w/mild thick liquid   -Aspiration precaution      Frequent fall  -fall precaution  -PT EVAL rec [facility]    Viral illness  -Positive RVP,Enterovirus  -No acute symptoms present  -Supportive care  -prn albuterol for sob  -prn Tylenol for fever.    DM type 2  -a1c 6.0  -[facility]      HTN  - high AM  - will start meds if sbp >180 PER neurology  - not on any home meds.     Lung Nodule  -Per Daughter. Following out pt.     Dementia with behavior disorder   -PER DAUGHTER PT IS IN HER BASELINE NOW  - Was on Aricept in past but DC by pcp  - continue Seroquel sertraline      Goal of care DW daughter-dnr/dni, MOLST on chart  Called  daughter .[phone],([phone]) DW ABOVE CARE OF PLAN,  daughter agreed, dw with AMILCAR FITZGERALD           89 F PMH dementia,unsteady gait w/multiple fall,non compliant with walker, DM,HTN,TIA not on statin for myopathy  ,OA,glaucoma, b/l knee replacement,lung nodule patient from [facility] s/p unwitnessed fall. Ed found to have New subacute/chronic left frontal lobe infarct.     subacute hemorrhage ruled out acute infarct by MRI brain    -mr brain: 12/09 -- 2.5 x 2.7 cm well demarcated region of T1 and T2 hyperintense signal in   the left anterolateral frontal lobe. The region demonstrates restricted   diffusion andsome susceptibility artifact.  Findings are consistent with the presence of late subacute hemorrhage.  -Repeat ct braib/cta HEAD.NECK Similar-appearing acute left anterolateral frontal infarct.Similar subtle regions of increased attenuation within the region of   infarction may represent spared gyri or petechial hemorrhagic   transformation.  -neurology  is following. Rec to continue ASA. no need neurosurgery consult. clear to discharge patient    - pt is intolerance of statin in past.  - ECho lv ef 60-65% stbale  -LDL 89,A1C 6.0    Dysphagia sec above  -Pureed w/mild thick liquid   -Aspiration precaution      Frequent fall  -fall precaution  -PT EVAL rec [facility]    Viral illness  -Positive RVP,Enterovirus  -No acute symptoms present  -Supportive care  -prn albuterol for sob  -prn Tylenol for fever.    DM type 2  -a1c 6.0  -[facility]      HTN  - high AM  - will start meds if sbp >180 PER neurology  - not on any home meds.     Lung Nodule  -Per Daughter. Following out pt.     Dementia with behavior disorder   -PER DAUGHTER PT IS IN HER BASELINE NOW  - Was on Aricept in past but DC by pcp  - continue Seroquel sertraline      Goal of care DW daughter-dnr/dni, MOLST on chart  Called  daughter .[phone]) DW ABOVE CARE OF PLAN,  daughter agreed, dw with SW   THONY FITZGERALD

## 2021-12-10 NOTE — DISCHARGE NOTE NURSING/CASE MANAGEMENT/SOCIAL WORK - NURSING SECTION COMPLETE
Pt's family would like for pt to attend [facility] Patient/Caregiver provided printed discharge information.

## 2021-12-10 NOTE — PROGRESS NOTE ADULT - ASSESSMENT
IMPRESSION:  - Left frontal subacute infarct with hemorrhagic transformation.  mechanism appears to be embolic either artery to artery embolism or cardiac in origin    ASSESSMENT/ PLAN:     - Neuro checks and vital signs Q 4 hours.  - SBP goal  normotensive after 12-9-21.  - Continue ASA 81 mg PO QD.  - Statin - not prescribed as she cannot tolerate statin.  - Telemetry monitoring.  - CTA of head and neck report reviewed  - MRI Brain report as above reviewed.  - Will Repeat CT head in am 12-11-21 ( ordered)  - TTE report as above reviewed.  - PT OT SLP following  - SCD/ SQ Heparin for DVT prophylaxis.         IMPRESSION:  - Left frontal subacute infarct with hemorrhagic transformation.  mechanism appears to be embolic either artery to artery embolism or cardiac in origin    ASSESSMENT/ PLAN:     - Neuro checks and vital signs Q 4 hours.  - SBP goal  normotensive after 12-9-21.  - Continue ASA 81 mg PO QD.  - Statin - not prescribed as she cannot tolerate statin.  - Telemetry monitoring.  - CTA of head and neck report reviewed  - MRI Brain report as above reviewed.  - TTE report as above reviewed.  - Family ( Daughter) does not wish aggressive management plans to be persued.  - PT OT SLP following  - SCD/ SQ Heparin for DVT prophylaxis.

## 2021-12-10 NOTE — DISCHARGE NOTE NURSING/CASE MANAGEMENT/SOCIAL WORK - NSDCFUADDAPPT_GEN_ALL_CORE_FT
STAR DOCUMENTATION:    PT IS FOR [facility] PLACEMENT  NEURO APPT F/U PENDING;SW CLERICAL AWARE-CCC FOLLOWING  VIVO MEDS TO BED NOT APPLICABLE;FACILITY WILL MANAGE PT'S MEDICATIONS   STAR DOCUMENTATION:    PT IS FOR [facility] PLACEMENT  DR. SHY GREWAL'S OFFICE WILL F/U FOR NEURO APPT [phone]  VIVO MEDS TO BED NOT APPLICABLE;FACILITY WILL MANAGE PT'S MEDICATIONS   STAR DOCUMENTATION:    PT IS FOR [facility] PLACEMENT  Patient is scheduled for Wednesday, 12/29/21, with JOBY Mcintyre [phone] at 10:00 am.  for a Neuro follow up appointment  Address:[address] [place] [address] MEDS TO BED NOT APPLICABLE;FACILITY WILL MANAGE PT'S MEDICATIONS

## 2021-12-10 NOTE — PROGRESS NOTE ADULT - SUBJECTIVE AND OBJECTIVE BOX
Patient is a 89y old  Female who presents with a chief complaint of stroke (10 Dec 2021 08:37)    Pt is calm,comfortable. AAOX1-2. Denies cp,sob,headache,any pain.    REVIEW OF SYSTEMS: AMS- Unable    MEDICATIONS  (STANDING):  aspirin enteric coated 81 milliGRAM(s) Oral daily  dextrose 40% Gel 15 Gram(s) Oral once  dextrose 5%. 1000 milliLiter(s) (50 mL/Hr) IV Continuous <Continuous>  dextrose 5%. 1000 milliLiter(s) (100 mL/Hr) IV Continuous <Continuous>  dextrose 50% Injectable 25 Gram(s) IV Push once  dextrose 50% Injectable 12.5 Gram(s) IV Push once  dextrose 50% Injectable 25 Gram(s) IV Push once  glucagon  Injectable 1 milliGRAM(s) IntraMuscular once  heparin   Injectable 5000 Unit(s) SubCutaneous every 8 hours  insulin lispro (ADMELOG) corrective regimen sliding scale   SubCutaneous three times a day before meals  insulin lispro (ADMELOG) corrective regimen sliding scale   SubCutaneous at bedtime  pantoprazole    Tablet 40 milliGRAM(s) Oral before breakfast  QUEtiapine 25 milliGRAM(s) Oral at bedtime  sertraline 25 milliGRAM(s) Oral daily  sodium chloride 0.9%. 1000 milliLiter(s) (50 mL/Hr) IV Continuous <Continuous>    MEDICATIONS  (PRN):  acetaminophen     Tablet .. 650 milliGRAM(s) Oral every 6 hours PRN Temp greater or equal to 38C (100.4F), Moderate Pain (4 - 6)  labetalol Injectable 5 milliGRAM(s) IV Push every 6 hours PRN sbp 180 or >180      CAPILLARY BLOOD GLUCOSE      POCT Blood Glucose.: 118 mg/dL (10 Dec 2021 09:11)  POCT Blood Glucose.: 146 mg/dL (09 Dec 2021 22:01)  POCT Blood Glucose.: 97 mg/dL (09 Dec 2021 17:31)    I&O's Summary    09 Dec 2021 07:01  -  10 Dec 2021 07:00  --------------------------------------------------------  IN: 250 mL / OUT: 0 mL / NET: 250 mL        PHYSICAL EXAM:  Vital Signs Last 24 Hrs  T(C): 36.8 (10 Dec 2021 12:27), Max: 36.8 (10 Dec 2021 12:27)  T(F): 98.2 (10 Dec 2021 12:27), Max: 98.2 (10 Dec 2021 12:27)  HR: 91 (10 Dec 2021 12:27) (73 - 91)  BP: 135/82 (10 Dec 2021 12:27) (135/82 - 151/75)  BP(mean): 79 (09 Dec 2021 22:11) (79 - 79)  RR: 18 (10 Dec 2021 12:27) (18 - 18)  SpO2: 94% (10 Dec 2021 12:27) (94% - 98%)    CONSTITUTIONAL: NAD,  EYES: PERRLA; conjunctiva and sclera clear  ENMT: Moist oral mucosa,   RESPIRATORY: Normal respiratory effort; lungs are clear to auscultation bilaterally  CARDIOVASCULAR: Regular rate and rhythm, normal S1 and S2, no murmur   EXTS: No lower extremity edema; Peripheral pulses are 2+ bilaterally  ABDOMEN: Nontender to palpation, normoactive bowel sounds, no rebound/guarding;   MUSCLOSKELETAL:   no clubbing or cyanosis of digits; no joint swelling or tenderness to palpation  PSYCH: affect appropriate  NEUROLOGY: A+O to person,on/off  place, and not to time; CN 2-12 are intact and symmetric; no gross sensory /motor deficits;       LABS:    12-09    141  |  102  |  11.4  ----------------------------<  92  4.3   |  22.0  |  0.67    Ca    8.9      09 Dec 2021 09:39                  RADIOLOGY & ADDITIONAL TESTS:  Results Reviewed:   < from: MR Head No Cont (12.09.21 @ 16:26) >  IMPRESSION:    2.5 x 2.7 cm well demarcated region of T1 and T2 hyperintense signal in   the left anterolateral frontal lobe. The region demonstrates restricted   diffusion andsome susceptibility artifact.    Findings are consistent with the presence of late subacute hemorrhage.    No acute infarction.    < end of copied text >    
    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  per EMS staff states patient is at baseline, patient with complaints of back pain which she has had before. no signs of trauma noted. Pt is poor historian. HX from daughter and ED physician. Per Daughter, pt has multiple fall,last fall oct 26, 2021, work up was negative. Pt was in [facility] ED. Pt is aaox2,non compliant with her walker. Did not tolerate Statin in past. code staus DNR/DNI.  Currently pt is AAOX2.      12-08-21  Remains neurologically stable,    12-09-21  Neurologically stable.  12-10-21  Neurologically exam is stable.    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        CT Head No Cont (12.07.21 @ 08:39) >  IMPRESSION:    CT head: New subacute/chronic left frontal lobe infarct. Consider contrast-enhanced MRI for further evaluation. No acute intracranial hemorrhage. Findings discussed with Dr. Brown    CT C-spine: Moderate spondylosis. No acute fracture dislocation cervical spine. Consider MRI as clinically warranted.    CT Abdomen and Pelvis No Cont (12.07.21 @ 08:40) >  IMPRESSION: Noevidence of acute visceral organ or bony injury on this noncontrast CT. Healing bilateral rib fractures.        CT Abdomen and Pelvis No Cont (12.07.21 @ 08:40) >  CHEST:  LUNGS AND LARGE AIRWAYS: Patent central airways. Again are noted scattered pulmonary nodules measuring up to 3 mm and scatteredmucus plugging. No confluent airspace opacity. No suspicious lung mass is identified.  PLEURA: No pleural effusion. Stable mild biapical pleural thickening and parenchymal scarring. No pneumothorax or pneumomediastinum.  VESSELS: Within normal limits.  HEART: Heart size is normal. No pericardial effusion.  MEDIASTINUM AND FRANCISCO: No lymphadenopathy.  CHEST WALL AND LOWER NECK: Within normal limits.    ABDOMEN AND PELVIS:  LIVER: Within normal limits.  BILE DUCTS: Normal caliber.  GALLBLADDER: Within normal limits.  SPLEEN: Within normal limits.  PANCREAS: Within normal limits.  ADRENALS: Within normal limits.  KIDNEYS/URETERS: Within normal limits.    BLADDER: Within normal limits.  REPRODUCTIVE ORGANS: Calcified uterine fibroid. Otherwise, theuterus and adnexa within normal limits.    BOWEL: Small hiatal hernia. Sigmoid diverticulosis. No bowel obstruction. Appendix is normal.  PERITONEUM: No ascites.  VESSELS: Within normal limits.  RETROPERITONEUM/LYMPH NODES: No lymphadenopathy.  ABDOMINAL WALL: Within normal limits.  BONES: Mild anterolisthesis of L2 on L3 and L3 on L4. Healing right distal fourth, fifth, and sixth rib fractures. Healing left lateral third, fourth, fifth, sixth, seventh, and eighth rib fractures.    IMPRESSION: Noevidence of acute visceral organ or bony injury on this noncontrast CT. Healing bilateral rib fractures.       (07 Dec 2021 13:15)    PMH: Dementia    DM (diabetes mellitus)         PSH:       FAMILY HISTORY:      SOCIAL HISTORY:  No history of tobacco or alcohol use     Allergies    simvastatin (Muscle Pain)    Intolerances        Vital Signs Last 24 Hrs  T(C): 36.6 (10 Dec 2021 17:19), Max: 36.8 (10 Dec 2021 12:27)  T(F): 97.9 (10 Dec 2021 17:19), Max: 98.2 (10 Dec 2021 12:27)  HR: 79 (10 Dec 2021 17:19) (76 - 91)  BP: 135/82 (10 Dec 2021 12:27) (135/82 - 148/-)  BP(mean): 79 (09 Dec 2021 22:11) (79 - 79)  RR: 18 (10 Dec 2021 17:19) (18 - 18)  SpO2: 97% (10 Dec 2021 17:19) (94% - 97%)    MEDICATIONS    acetaminophen     Tablet .. 650 milliGRAM(s) Oral every 6 hours PRN  aspirin enteric coated 81 milliGRAM(s) Oral daily  dextrose 40% Gel 15 Gram(s) Oral once  dextrose 5%. 1000 milliLiter(s) IV Continuous <Continuous>  dextrose 5%. 1000 milliLiter(s) IV Continuous <Continuous>  dextrose 50% Injectable 25 Gram(s) IV Push once  dextrose 50% Injectable 12.5 Gram(s) IV Push once  dextrose 50% Injectable 25 Gram(s) IV Push once  glucagon  Injectable 1 milliGRAM(s) IntraMuscular once  heparin   Injectable 5000 Unit(s) SubCutaneous every 8 hours  insulin lispro (ADMELOG) corrective regimen sliding scale   SubCutaneous three times a day before meals  insulin lispro (ADMELOG) corrective regimen sliding scale   SubCutaneous at bedtime  labetalol Injectable 5 milliGRAM(s) IV Push every 6 hours PRN  pantoprazole    Tablet 40 milliGRAM(s) Oral before breakfast  QUEtiapine 25 milliGRAM(s) Oral at bedtime  sertraline 25 milliGRAM(s) Oral daily  sodium chloride 0.9%. 1000 milliLiter(s) IV Continuous <Continuous>         LABS:      12-09    141  |  102  |  11.4  ----------------------------<  92  4.3   |  22.0  |  0.67    Ca    8.9      09 Dec 2021 09:39      1On Neurological Examination:    Mental Status - Patient is alert, awake, oriented X1.   Has some word finding difficulty and difficulty with repetition  There is no dysarthria, follows simple commands     Cranial Nerves - PERRL, EOMI,  Visual fields are full to finger counting, no gross facial asymmetry, tongue/uvula midline    Motor Exam -   Right upper ---4+/5 with slight drift and slow FFM  Left upper ---5/5 No drift  Right lower ---5/5 No drift  Left lower  ---5/5 No drift     nml bulk/tone    Sensory    Intact to light touch and pinprick bilaterally    Coord: FTN intact bilaterally     Gait -  Not assessed.                  [facility] SS:   Date: 12-  Time: 0845  1a) Level of consciousness (0-3):   1b) Questions (0-2):   1c) Commands (0-2):   2  ) Gaze (0-2):   3  ) Visual field (0-3):   4  ) Facial palsy (0-3):   Motor  5a) Left arm (0-4):    5b) Right arm (0-4): ---------------------------------------------1  6a) Left leg (0-4):   6b) Right leg (0-4):   7  ) Ataxia (0-2):   Sensory  8  ) Sensory (0-2):   Speech  9  ) Language (0-3): -----------------------------------------------1  10) Dysarthria (0-2):   Extinction  11) Extinction/inattention (0-2):     Total score: =2    Prestroke Modified [place]: =3              RADIOLOGY  [facility]      CT Head No Cont (12.07.21 @ 08:39) >  IMPRESSION:    CT head: New subacute/chronic left frontal lobe infarct. Consider contrast-enhanced MRI for further evaluation. No acute intracranial hemorrhage. Findings discussed with Dr. Brown    CT C-spine: Moderate spondylosis. No acute fracture dislocation cervical spine. Consider MRI as clinically warranted.     CT Angio Head w/ IV Cont (12.08.21 @ 03:27) >  IMPRESSION:    CT brain:  Similar-appearing acute left anterolateral frontal infarct.    Similar subtle regions of increased attenuation within the region of   infarction may represent spared gyri or petechial hemorrhagic   transformation.    CTA brain:  No flow-limiting stenosis or vascular aneurysm. No AVM.    CTA neck:  No flow-limiting stenosis, evidence for arterial dissection, or vascular   aneurysm.      MRI:  MR Head No Cont (12.09.21 @ 16:26) >  IMPRESSION:    2.5 x 2.7 cm well demarcated region of T1 and T2 hyperintense signal in   the left anterolateral frontal lobe. The region demonstrates restricted   diffusion andsome susceptibility artifact.    Findings are consistent with the presence of late subacute hemorrhage.    No acute infarction.      TTE  TTE Echo Complete w/o Contrast w/ Doppler (12.08.21 @ 12:33) >    Summary:   1. Technically limited study.   2. Endocardial visualization was enhanced with intravenous echo contrast.   3. Normal global left ventricular systolic function.   4. Left ventricular ejection fraction, by visual estimation, is 60 to   65%.   5. Spectral Doppler shows impaired relaxation pattern of left   ventricular myocardial filling (Grade I diastolic dysfunction).   6. Mild mitral valve regurgitation.   7. Mild thickening of the anterior and posterior mitral valve leaflets.   8. Mild tricuspid regurgitation.   9. Normal right ventricular size and function.  10. There is no evidence of pericardial effusion.    MD Lucio Electronically signed on 12/8/2021 at 4:01:07 PM                      
    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  per EMS staff states patient is at baseline, patient with complaints of back pain which she has had before. no signs of trauma noted. Pt is poor historian. HX from daughter and ED physician. Per Daughter, pt has multiple fall,last fall oct 26, 2021, work up was negative. Pt was in [facility] ED. Pt is aaox2,non compliant with her walker. Did not tolerate Statin in past. code staus DNR/DNI.  Currently pt is AAOX2.      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        CT Head No Cont (12.07.21 @ 08:39) >  IMPRESSION:    CT head: New subacute/chronic left frontal lobe infarct. Consider contrast-enhanced MRI for further evaluation. No acute intracranial hemorrhage. Findings discussed with Dr. Brown    CT C-spine: Moderate spondylosis. No acute fracture dislocation cervical spine. Consider MRI as clinically warranted.    CT Abdomen and Pelvis No Cont (12.07.21 @ 08:40) >  IMPRESSION: Noevidence of acute visceral organ or bony injury on this noncontrast CT. Healing bilateral rib fractures.        CT Abdomen and Pelvis No Cont (12.07.21 @ 08:40) >  CHEST:  LUNGS AND LARGE AIRWAYS: Patent central airways. Again are noted scattered pulmonary nodules measuring up to 3 mm and scatteredmucus plugging. No confluent airspace opacity. No suspicious lung mass is identified.  PLEURA: No pleural effusion. Stable mild biapical pleural thickening and parenchymal scarring. No pneumothorax or pneumomediastinum.  VESSELS: Within normal limits.  HEART: Heart size is normal. No pericardial effusion.  MEDIASTINUM AND FRANCISCO: No lymphadenopathy.  CHEST WALL AND LOWER NECK: Within normal limits.    ABDOMEN AND PELVIS:  LIVER: Within normal limits.  BILE DUCTS: Normal caliber.  GALLBLADDER: Within normal limits.  SPLEEN: Within normal limits.  PANCREAS: Within normal limits.  ADRENALS: Within normal limits.  KIDNEYS/URETERS: Within normal limits.    BLADDER: Within normal limits.  REPRODUCTIVE ORGANS: Calcified uterine fibroid. Otherwise, theuterus and adnexa within normal limits.    BOWEL: Small hiatal hernia. Sigmoid diverticulosis. No bowel obstruction. Appendix is normal.  PERITONEUM: No ascites.  VESSELS: Within normal limits.  RETROPERITONEUM/LYMPH NODES: No lymphadenopathy.  ABDOMINAL WALL: Within normal limits.  BONES: Mild anterolisthesis of L2 on L3 and L3 on L4. Healing right distal fourth, fifth, and sixth rib fractures. Healing left lateral third, fourth, fifth, sixth, seventh, and eighth rib fractures.    IMPRESSION: Noevidence of acute visceral organ or bony injury on this noncontrast CT. Healing bilateral rib fractures.       (07 Dec 2021 13:15)    PMH: Dementia    DM (diabetes mellitus)         PSH:       FAMILY HISTORY:      SOCIAL HISTORY:  No history of tobacco or alcohol use     Allergies    simvastatin (Muscle Pain)    Intolerances        Vital Signs Last 24 Hrs  T(C): 36.7 (08 Dec 2021 16:00), Max: 36.9 (08 Dec 2021 07:25)  T(F): 98 (08 Dec 2021 16:00), Max: 98.4 (08 Dec 2021 07:25)  HR: 71 (08 Dec 2021 16:00) (68 - 97)  BP: 154/79 (08 Dec 2021 16:00) (137/79 - 183/81)  BP(mean): --  RR: 18 (08 Dec 2021 16:00) (16 - 26)  SpO2: 100% (08 Dec 2021 16:00) (96% - 100%)    MEDICATIONS    acetaminophen     Tablet .. 650 milliGRAM(s) Oral every 6 hours PRN  ALBUTerol    0.083% 2.5 milliGRAM(s) Nebulizer every 6 hours  aspirin enteric coated 81 milliGRAM(s) Oral daily  dextrose 40% Gel 15 Gram(s) Oral once  dextrose 5%. 1000 milliLiter(s) IV Continuous <Continuous>  dextrose 5%. 1000 milliLiter(s) IV Continuous <Continuous>  dextrose 50% Injectable 25 Gram(s) IV Push once  dextrose 50% Injectable 12.5 Gram(s) IV Push once  dextrose 50% Injectable 25 Gram(s) IV Push once  glucagon  Injectable 1 milliGRAM(s) IntraMuscular once  heparin   Injectable 5000 Unit(s) SubCutaneous every 8 hours  insulin lispro (ADMELOG) corrective regimen sliding scale   SubCutaneous every 3 hours  labetalol Injectable 10 milliGRAM(s) IV Push every 8 hours PRN  pantoprazole    Tablet 40 milliGRAM(s) Oral before breakfast  QUEtiapine 25 milliGRAM(s) Oral at bedtime  sertraline 25 milliGRAM(s) Oral daily  sodium chloride 0.9%. 1000 milliLiter(s) IV Continuous <Continuous>         LABS:  CBC Full  -  ( 07 Dec 2021 08:23 )  WBC Count : 4.55 K/uL  RBC Count : 3.87 M/uL  Hemoglobin : 11.8 g/dL  Hematocrit : 35.5 %  Platelet Count - Automated : 131 K/uL  Mean Cell Volume : 91.7 fl  Mean Cell Hemoglobin : 30.5 pg  Mean Cell Hemoglobin Concentration : 33.2 gm/dL  Auto Neutrophil # : 2.60 K/uL  Auto Lymphocyte # : 1.11 K/uL  Auto Monocyte # : 0.57 K/uL  Auto Eosinophil # : 0.24 K/uL  Auto Basophil # : 0.02 K/uL  Auto Neutrophil % : 57.2 %  Auto Lymphocyte % : 24.4 %  Auto Monocyte % : 12.5 %  Auto Eosinophil % : 5.3 %  Auto Basophil % : 0.4 %      12-08    138  |  100  |  9.3  ----------------------------<  105<H>  4.2   |  24.0  |  0.61    Ca    8.7      08 Dec 2021 08:59    TPro  6.6  /  Alb  3.8  /  TBili  1.0  /  DBili  x   /  AST  20  /  ALT  13  /  AlkPhos  118  12-07    LIVER FUNCTIONS - ( 07 Dec 2021 08:23 )  Alb: 3.8 g/dL / Pro: 6.6 g/dL / ALK PHOS: 118 U/L / ALT: 13 U/L / AST: 20 U/L / GGT: x           Hemoglobin A1C:   Lipid Panel 12-08 @ 08:59  Total Cholesterol, Serum 168  LDL --  Triglycerides 78    LABS:  CBC Full  -  ( 07 Dec 2021 08:23 )  WBC Count : 4.55 K/uL  RBC Count : 3.87 M/uL  Hemoglobin : 11.8 g/dL  Hematocrit : 35.5 %  Platelet Count - Automated : 131 K/uL  Mean Cell Volume : 91.7 fl  Mean Cell Hemoglobin : 30.5 pg  Mean Cell Hemoglobin Concentration : 33.2 gm/dL  Auto Neutrophil # : 2.60 K/uL  Auto Lymphocyte # : 1.11 K/uL  Auto Monocyte # : 0.57 K/uL  Auto Eosinophil # : 0.24 K/uL  Auto Basophil # : 0.02 K/uL  Auto Neutrophil % : 57.2 %  Auto Lymphocyte % : 24.4 %  Auto Monocyte % : 12.5 %  Auto Eosinophil % : 5.3 %  Auto Basophil % : 0.4 %      12-07    142  |  104  |  14.9  ----------------------------<  110<H>  4.6   |  26.0  |  0.74    Ca    8.7      07 Dec 2021 08:23    TPro  6.6  /  Alb  3.8  /  TBili  1.0  /  DBili  x   /  AST  20  /  ALT  13  /  AlkPhos  118  12-07    LIVER FUNCTIONS - ( 07 Dec 2021 08:23 )  Alb: 3.8 g/dL / Pro: 6.6 g/dL / ALK PHOS: 118 U/L / ALT: 13 U/L / AST: 20 U/L / GGT: x           Hemoglobin A1C:     On Neurological Examination:    Mental Status - Patient is alert, awake, oriented X1.   Has some word finding difficulty and difficulty with repetition  There is no dysarthria, follows simple commands     Cranial Nerves - PERRL, EOMI,  Visual fields are full to finger counting, no gross facial asymmetry, tongue/uvula midline    Motor Exam -   Right upper ---4+/5 with slight drift and slow FFM  Left upper ---5/5 No drift  Right lower ---5/5 No drift  Left lower  ---5/5 No drift     nml bulk/tone    Sensory    Intact to light touch and pinprick bilaterally    Coord: FTN intact bilaterally     Gait -  Not assessed.                  [facility] SS:   Date: 12-8-2021  Time: 0950  1a) Level of consciousness (0-3):   1b) Questions (0-2):   1c) Commands (0-2):   2  ) Gaze (0-2):   3  ) Visual field (0-3):   4  ) Facial palsy (0-3):   Motor  5a) Left arm (0-4):    5b) Right arm (0-4): ---------------------------------------------1  6a) Left leg (0-4):   6b) Right leg (0-4):   7  ) Ataxia (0-2):   Sensory  8  ) Sensory (0-2):   Speech  9  ) Language (0-3): -----------------------------------------------1  10) Dysarthria (0-2):   Extinction  11) Extinction/inattention (0-2):     Total score: =2    Prestroke Modified [place]: =3              RADIOLOGY  CT      CT Head No Cont (12.07.21 @ 08:39) >  IMPRESSION:    CT head: New subacute/chronic left frontal lobe infarct. Consider contrast-enhanced MRI for further evaluation. No acute intracranial hemorrhage. Findings discussed with Dr. Brown    CT C-spine: Moderate spondylosis. No acute fracture dislocation cervical spine. Consider MRI as clinically warranted.     CT Angio Head w/ IV Cont (12.08.21 @ 03:27) >  IMPRESSION:    CT brain:  Similar-appearing acute left anterolateral frontal infarct.    Similar subtle regions of increased attenuation within the region of   infarction may represent spared gyri or petechial hemorrhagic   transformation.    CTA brain:  No flow-limiting stenosis or vascular aneurysm. No AVM.    CTA neck:  No flow-limiting stenosis, evidence for arterial dissection, or vascular   aneurysm.              MRI:        TTE  TTE Echo Complete w/o Contrast w/ Doppler (12.08.21 @ 12:33) >    Summary:   1. Technically limited study.   2. Endocardial visualization was enhanced with intravenous echo contrast.   3. Normal global left ventricular systolic function.   4. Left ventricular ejection fraction, by visual estimation, is 60 to   65%.   5. Spectral Doppler shows impaired relaxation pattern of left   ventricular myocardial filling (Grade I diastolic dysfunction).   6. Mild mitral valve regurgitation.   7. Mild thickening of the anterior and posterior mitral valve leaflets.   8. Mild tricuspid regurgitation.   9. Normal right ventricular size and function.  10. There is no evidence of pericardial effusion.    MD Lucio Electronically signed on 12/8/2021 at 4:01:07 PM      < end of copied text >                
    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  per EMS staff states patient is at baseline, patient with complaints of back pain which she has had before. no signs of trauma noted. Pt is poor historian. HX from daughter and ED physician. Per Daughter, pt has multiple fall,last fall oct 26, 2021, work up was negative. Pt was in [facility] ED. Pt is aaox2,non compliant with her walker. Did not tolerate Statin in past. code staus DNR/DNI.  Currently pt is AAOX2.      12-08-21  Remains neurologically stable,    12-09-21  Neurologically stable.    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        CT Head No Cont (12.07.21 @ 08:39) >  IMPRESSION:    CT head: New subacute/chronic left frontal lobe infarct. Consider contrast-enhanced MRI for further evaluation. No acute intracranial hemorrhage. Findings discussed with Dr. Brown    CT C-spine: Moderate spondylosis. No acute fracture dislocation cervical spine. Consider MRI as clinically warranted.    CT Abdomen and Pelvis No Cont (12.07.21 @ 08:40) >  IMPRESSION: Noevidence of acute visceral organ or bony injury on this noncontrast CT. Healing bilateral rib fractures.        CT Abdomen and Pelvis No Cont (12.07.21 @ 08:40) >  CHEST:  LUNGS AND LARGE AIRWAYS: Patent central airways. Again are noted scattered pulmonary nodules measuring up to 3 mm and scatteredmucus plugging. No confluent airspace opacity. No suspicious lung mass is identified.  PLEURA: No pleural effusion. Stable mild biapical pleural thickening and parenchymal scarring. No pneumothorax or pneumomediastinum.  VESSELS: Within normal limits.  HEART: Heart size is normal. No pericardial effusion.  MEDIASTINUM AND FRANCISCO: No lymphadenopathy.  CHEST WALL AND LOWER NECK: Within normal limits.    ABDOMEN AND PELVIS:  LIVER: Within normal limits.  BILE DUCTS: Normal caliber.  GALLBLADDER: Within normal limits.  SPLEEN: Within normal limits.  PANCREAS: Within normal limits.  ADRENALS: Within normal limits.  KIDNEYS/URETERS: Within normal limits.    BLADDER: Within normal limits.  REPRODUCTIVE ORGANS: Calcified uterine fibroid. Otherwise, theuterus and adnexa within normal limits.    BOWEL: Small hiatal hernia. Sigmoid diverticulosis. No bowel obstruction. Appendix is normal.  PERITONEUM: No ascites.  VESSELS: Within normal limits.  RETROPERITONEUM/LYMPH NODES: No lymphadenopathy.  ABDOMINAL WALL: Within normal limits.  BONES: Mild anterolisthesis of L2 on L3 and L3 on L4. Healing right distal fourth, fifth, and sixth rib fractures. Healing left lateral third, fourth, fifth, sixth, seventh, and eighth rib fractures.    IMPRESSION: Noevidence of acute visceral organ or bony injury on this noncontrast CT. Healing bilateral rib fractures.       (07 Dec 2021 13:15)    PMH: Dementia    DM (diabetes mellitus)         PSH:       FAMILY HISTORY:      SOCIAL HISTORY:  No history of tobacco or alcohol use     Allergies    simvastatin (Muscle Pain)    Intolerances        Vital Signs Last 24 Hrs  T(C): 36.8 (09 Dec 2021 10:52), Max: 37.1 (09 Dec 2021 07:23)  T(F): 98.2 (09 Dec 2021 10:52), Max: 98.7 (09 Dec 2021 07:23)  HR: 76 (09 Dec 2021 11:44) (70 - 89)  BP: 149/66 (09 Dec 2021 11:44) (136/63 - 193/77)  BP(mean): 103 (09 Dec 2021 04:20) (90 - 127)  RR: 18 (09 Dec 2021 10:52) (12 - 18)  SpO2: 97% (09 Dec 2021 10:55) (95% - 98%)    MEDICATIONS    acetaminophen     Tablet .. 650 milliGRAM(s) Oral every 6 hours PRN  aspirin enteric coated 81 milliGRAM(s) Oral daily  dextrose 40% Gel 15 Gram(s) Oral once  dextrose 5%. 1000 milliLiter(s) IV Continuous <Continuous>  dextrose 5%. 1000 milliLiter(s) IV Continuous <Continuous>  dextrose 50% Injectable 25 Gram(s) IV Push once  dextrose 50% Injectable 12.5 Gram(s) IV Push once  dextrose 50% Injectable 25 Gram(s) IV Push once  glucagon  Injectable 1 milliGRAM(s) IntraMuscular once  heparin   Injectable 5000 Unit(s) SubCutaneous every 8 hours  insulin lispro (ADMELOG) corrective regimen sliding scale   SubCutaneous three times a day before meals  insulin lispro (ADMELOG) corrective regimen sliding scale   SubCutaneous at bedtime  labetalol Injectable 5 milliGRAM(s) IV Push every 6 hours PRN  pantoprazole    Tablet 40 milliGRAM(s) Oral before breakfast  QUEtiapine 25 milliGRAM(s) Oral at bedtime  sertraline 25 milliGRAM(s) Oral daily  sodium chloride 0.9%. 1000 milliLiter(s) IV Continuous <Continuous>         LABS:      12-09    141  |  102  |  11.4  ----------------------------<  92  4.3   |  22.0  |  0.67    Ca    8.9      09 Dec 2021 09:39        Hemoglobin A1C:        LABS:  CBC Full  -  ( 07 Dec 2021 08:23 )  WBC Count : 4.55 K/uL  RBC Count : 3.87 M/uL  Hemoglobin : 11.8 g/dL  Hematocrit : 35.5 %  Platelet Count - Automated : 131 K/uL  Mean Cell Volume : 91.7 fl  Mean Cell Hemoglobin : 30.5 pg  Mean Cell Hemoglobin Concentration : 33.2 gm/dL  Auto Neutrophil # : 2.60 K/uL  Auto Lymphocyte # : 1.11 K/uL  Auto Monocyte # : 0.57 K/uL  Auto Eosinophil # : 0.24 K/uL  Auto Basophil # : 0.02 K/uL  Auto Neutrophil % : 57.2 %  Auto Lymphocyte % : 24.4 %  Auto Monocyte % : 12.5 %  Auto Eosinophil % : 5.3 %  Auto Basophil % : 0.4 %      12-08    138  |  100  |  9.3  ----------------------------<  105<H>  4.2   |  24.0  |  0.61    Ca    8.7      08 Dec 2021 08:59    TPro  6.6  /  Alb  3.8  /  TBili  1.0  /  DBili  x   /  AST  20  /  ALT  13  /  AlkPhos  118  12-07    LIVER FUNCTIONS - ( 07 Dec 2021 08:23 )  Alb: 3.8 g/dL / Pro: 6.6 g/dL / ALK PHOS: 118 U/L / ALT: 13 U/L / AST: 20 U/L / GGT: x           Hemoglobin A1C:   Lipid Panel 12-08 @ 08:59  Total Cholesterol, Serum 168  LDL --  Triglycerides 78    LABS:  CBC Full  -  ( 07 Dec 2021 08:23 )  WBC Count : 4.55 K/uL  RBC Count : 3.87 M/uL  Hemoglobin : 11.8 g/dL  Hematocrit : 35.5 %  Platelet Count - Automated : 131 K/uL  Mean Cell Volume : 91.7 fl  Mean Cell Hemoglobin : 30.5 pg  Mean Cell Hemoglobin Concentration : 33.2 gm/dL  Auto Neutrophil # : 2.60 K/uL  Auto Lymphocyte # : 1.11 K/uL  Auto Monocyte # : 0.57 K/uL  Auto Eosinophil # : 0.24 K/uL  Auto Basophil # : 0.02 K/uL  Auto Neutrophil % : 57.2 %  Auto Lymphocyte % : 24.4 %  Auto Monocyte % : 12.5 %  Auto Eosinophil % : 5.3 %  Auto Basophil % : 0.4 %      12-07    142  |  104  |  14.9  ----------------------------<  110<H>  4.6   |  26.0  |  0.74    Ca    8.7      07 Dec 2021 08:23    TPro  6.6  /  Alb  3.8  /  TBili  1.0  /  DBili  x   /  AST  20  /  ALT  13  /  AlkPhos  118  12-07    LIVER FUNCTIONS - ( 07 Dec 2021 08:23 )  Alb: 3.8 g/dL / Pro: 6.6 g/dL / ALK PHOS: 118 U/L / ALT: 13 U/L / AST: 20 U/L / GGT: x           Hemoglobin A1C:     On Neurological Examination:    Mental Status - Patient is alert, awake, oriented X1.   Has some word finding difficulty and difficulty with repetition  There is no dysarthria, follows simple commands     Cranial Nerves - PERRL, EOMI,  Visual fields are full to finger counting, no gross facial asymmetry, tongue/uvula midline    Motor Exam -   Right upper ---4+/5 with slight drift and slow FFM  Left upper ---5/5 No drift  Right lower ---5/5 No drift  Left lower  ---5/5 No drift     nml bulk/tone    Sensory    Intact to light touch and pinprick bilaterally    Coord: FTN intact bilaterally     Gait -  Not assessed.                  [facility] SS:   Date: 12-9-2021  Time: 0930  1a) Level of consciousness (0-3):   1b) Questions (0-2):   1c) Commands (0-2):   2  ) Gaze (0-2):   3  ) Visual field (0-3):   4  ) Facial palsy (0-3):   Motor  5a) Left arm (0-4):    5b) Right arm (0-4): ---------------------------------------------1  6a) Left leg (0-4):   6b) Right leg (0-4):   7  ) Ataxia (0-2):   Sensory  8  ) Sensory (0-2):   Speech  9  ) Language (0-3): -----------------------------------------------1  10) Dysarthria (0-2):   Extinction  11) Extinction/inattention (0-2):     Total score: =2    Prestroke Modified [place]: =3              RADIOLOGY  [facility]      CT Head No Cont (12.07.21 @ 08:39) >  IMPRESSION:    CT head: New subacute/chronic left frontal lobe infarct. Consider contrast-enhanced MRI for further evaluation. No acute intracranial hemorrhage. Findings discussed with Dr. rBown    CT C-spine: Moderate spondylosis. No acute fracture dislocation cervical spine. Consider MRI as clinically warranted.     CT Angio Head w/ IV Cont (12.08.21 @ 03:27) >  IMPRESSION:    CT brain:  Similar-appearing acute left anterolateral frontal infarct.    Similar subtle regions of increased attenuation within the region of   infarction may represent spared gyri or petechial hemorrhagic   transformation.    CTA brain:  No flow-limiting stenosis or vascular aneurysm. No AVM.    CTA neck:  No flow-limiting stenosis, evidence for arterial dissection, or vascular   aneurysm.              MRI:        TTE  TTE Echo Complete w/o Contrast w/ Doppler (12.08.21 @ 12:33) >    Summary:   1. Technically limited study.   2. Endocardial visualization was enhanced with intravenous echo contrast.   3. Normal global left ventricular systolic function.   4. Left ventricular ejection fraction, by visual estimation, is 60 to   65%.   5. Spectral Doppler shows impaired relaxation pattern of left   ventricular myocardial filling (Grade I diastolic dysfunction).   6. Mild mitral valve regurgitation.   7. Mild thickening of the anterior and posterior mitral valve leaflets.   8. Mild tricuspid regurgitation.   9. Normal right ventricular size and function.  10. There is no evidence of pericardial effusion.    MD Lucio Electronically signed on 12/8/2021 at 4:01:07 PM      < end of copied text >                
Patient is a 89y old  Female who presents with a chief complaint of stroke (08 Dec 2021 17:56)  Pt seen and exam. Denies any headcahe,cp,sob,n/v/cough.  REVIEW OF SYSTEMS:not reliable -dementia    MEDICATIONS  (STANDING):  ALBUTerol    0.083% 2.5 milliGRAM(s) Nebulizer every 6 hours  aspirin enteric coated 81 milliGRAM(s) Oral daily  dextrose 40% Gel 15 Gram(s) Oral once  dextrose 5%. 1000 milliLiter(s) (50 mL/Hr) IV Continuous <Continuous>  dextrose 5%. 1000 milliLiter(s) (100 mL/Hr) IV Continuous <Continuous>  dextrose 50% Injectable 25 Gram(s) IV Push once  dextrose 50% Injectable 12.5 Gram(s) IV Push once  dextrose 50% Injectable 25 Gram(s) IV Push once  glucagon  Injectable 1 milliGRAM(s) IntraMuscular once  heparin   Injectable 5000 Unit(s) SubCutaneous every 8 hours  insulin lispro (ADMELOG) corrective regimen sliding scale   SubCutaneous three times a day before meals  insulin lispro (ADMELOG) corrective regimen sliding scale   SubCutaneous at bedtime  pantoprazole    Tablet 40 milliGRAM(s) Oral before breakfast  QUEtiapine 25 milliGRAM(s) Oral at bedtime  sertraline 25 milliGRAM(s) Oral daily  sodium chloride 0.9%. 1000 milliLiter(s) (50 mL/Hr) IV Continuous <Continuous>    MEDICATIONS  (PRN):  acetaminophen     Tablet .. 650 milliGRAM(s) Oral every 6 hours PRN Temp greater or equal to 38C (100.4F), Moderate Pain (4 - 6)  labetalol Injectable 5 milliGRAM(s) IV Push every 6 hours PRN sbp 180 or >180      CAPILLARY BLOOD GLUCOSE      POCT Blood Glucose.: 103 mg/dL (09 Dec 2021 11:47)  POCT Blood Glucose.: 89 mg/dL (09 Dec 2021 04:44)  POCT Blood Glucose.: 115 mg/dL (09 Dec 2021 01:07)  POCT Blood Glucose.: 102 mg/dL (08 Dec 2021 20:49)  POCT Blood Glucose.: 105 mg/dL (08 Dec 2021 16:27)    I&O's Summary    08 Dec 2021 07:01  -  09 Dec 2021 07:00  --------------------------------------------------------  IN: 950 mL / OUT: 0 mL / NET: 950 mL    09 Dec 2021 07:01  -  09 Dec 2021 14:17  --------------------------------------------------------  IN: 250 mL / OUT: 0 mL / NET: 250 mL        PHYSICAL EXAM:  Vital Signs Last 24 Hrs  T(C): 36.8 (09 Dec 2021 10:52), Max: 37.1 (09 Dec 2021 07:23)  T(F): 98.2 (09 Dec 2021 10:52), Max: 98.7 (09 Dec 2021 07:23)  HR: 76 (09 Dec 2021 11:44) (70 - 89)  BP: 149/66 (09 Dec 2021 11:44) (136/63 - 193/77)  BP(mean): 103 (09 Dec 2021 04:20) (90 - 127)  RR: 18 (09 Dec 2021 10:52) (12 - 18)  SpO2: 97% (09 Dec 2021 10:55) (95% - 100%)    CONSTITUTIONAL: NAD,  EYES: PERRLA; conjunctiva and sclera clear  ENMT: Moist oral mucosa,   RESPIRATORY: Normal respiratory effort; lungs are clear to auscultation bilaterally  CARDIOVASCULAR: Regular rate and rhythm, normal S1 and S2, no murmur   EXTS: No lower extremity edema; Peripheral pulses are 2+ bilaterally  ABDOMEN: Nontender to palpation, normoactive bowel sounds, no rebound/guarding;   MUSCLOSKELETAL:   no  cyanosis of digits; no joint swelling or tenderness to palpation  PSYCH: affect appropriate  NEUROLOGY: A+O to person, on/off place, and not to time; CN 2-12 are intact and symmetric; no gross sensory/motor deficits;       LABS:    12-09    141  |  102  |  11.4  ----------------------------<  92  4.3   |  22.0  |  0.67    Ca    8.9      09 Dec 2021 09:39                  RADIOLOGY & ADDITIONAL TESTS:  Results Reviewed:   
Patient is a 89y old  Female who presents with a chief complaint of stroke (07 Dec 2021 16:42)    Pt seen and exam. Pt is calm.Denies any headache,neck/back pain,cough,sob,weakness,numbness. AAOX2 AM.  REVIEW OF SYSTEMS: All systems are reviewed and found to be negative except above-NOT RELIABLE For dementia    MEDICATIONS  (STANDING):  ALBUTerol    0.083% 2.5 milliGRAM(s) Nebulizer every 6 hours  aspirin Suppository 300 milliGRAM(s) Rectal daily  dextrose 40% Gel 15 Gram(s) Oral once  dextrose 5%. 1000 milliLiter(s) (50 mL/Hr) IV Continuous <Continuous>  dextrose 5%. 1000 milliLiter(s) (100 mL/Hr) IV Continuous <Continuous>  dextrose 50% Injectable 25 Gram(s) IV Push once  dextrose 50% Injectable 12.5 Gram(s) IV Push once  dextrose 50% Injectable 25 Gram(s) IV Push once  glucagon  Injectable 1 milliGRAM(s) IntraMuscular once  heparin   Injectable 5000 Unit(s) SubCutaneous every 8 hours  insulin lispro (ADMELOG) corrective regimen sliding scale   SubCutaneous every 3 hours  pantoprazole    Tablet 40 milliGRAM(s) Oral before breakfast  QUEtiapine 25 milliGRAM(s) Oral at bedtime  sertraline 25 milliGRAM(s) Oral daily  sodium chloride 0.9%. 1000 milliLiter(s) (50 mL/Hr) IV Continuous <Continuous>    MEDICATIONS  (PRN):  acetaminophen     Tablet .. 650 milliGRAM(s) Oral every 6 hours PRN Temp greater or equal to 38C (100.4F), Moderate Pain (4 - 6)  labetalol Injectable 10 milliGRAM(s) IV Push every 8 hours PRN if sbp>200      CAPILLARY BLOOD GLUCOSE      POCT Blood Glucose.: 108 mg/dL (08 Dec 2021 11:18)  POCT Blood Glucose.: 111 mg/dL (08 Dec 2021 07:47)  POCT Blood Glucose.: 121 mg/dL (07 Dec 2021 22:54)    I&O's Summary    07 Dec 2021 07:01  -  08 Dec 2021 07:00  --------------------------------------------------------  IN: 750 mL / OUT: 0 mL / NET: 750 mL    08 Dec 2021 07:01  -  08 Dec 2021 12:08  --------------------------------------------------------  IN: 250 mL / OUT: 0 mL / NET: 250 mL        PHYSICAL EXAM:  Vital Signs Last 24 Hrs  T(C): 36.8 (08 Dec 2021 10:57), Max: 36.9 (08 Dec 2021 07:25)  T(F): 98.2 (08 Dec 2021 10:57), Max: 98.4 (08 Dec 2021 07:25)  HR: 79 (08 Dec 2021 10:30) (68 - 97)  BP: 181/74 (08 Dec 2021 10:30) (144/60 - 183/81)  BP(mean): --  RR: 20 (08 Dec 2021 10:30) (16 - 26)  SpO2: 100% (08 Dec 2021 10:30) (96% - 100%)    CONSTITUTIONAL: NAD,  EYES: PERRLA; conjunctiva and sclera clear  ENMT: Moist oral mucosa,   RESPIRATORY: Normal respiratory effort; lungs are clear to auscultation bilaterally  CARDIOVASCULAR: Regular rate and rhythm, normal S1 and S2, no murmur   EXTS: No lower extremity edema; Peripheral pulses are 2+ bilaterally  ABDOMEN: Nontender to palpation, normoactive bowel sounds, no rebound/guarding;   MUSCLOSKELETAL:   no clubbing or cyanosis of digits; no joint swelling or tenderness to palpation  PSYCH: affect appropriate  NEUROLOGY: A+O to person, on/off place, and not to  time; CN 2-12 are intact and symmetric; no gross sensory/motor  deficits;       LABS:                        11.8   4.55  )-----------( 131      ( 07 Dec 2021 08:23 )             35.5     12-08    138  |  100  |  9.3  ----------------------------<  105<H>  4.2   |  24.0  |  0.61    Ca    8.7      08 Dec 2021 08:59    TPro  6.6  /  Alb  3.8  /  TBili  1.0  /  DBili  x   /  AST  20  /  ALT  13  /  AlkPhos  118  12-07                RADIOLOGY & ADDITIONAL TESTS:  Results Reviewed:   < from: CT Angio Head w/ IV Cont (12.08.21 @ 03:27) >  CT brain:  Similar-appearing acute left anterolateral frontal infarct.    Similar subtle regions of increased attenuation within the region of   infarction may represent spared gyri or petechial hemorrhagic   transformation.    CTA brain:  No flow-limiting stenosis or vascular aneurysm. No AVM.    CTA neck:  No flow-limiting stenosis, evidence for arterial dissection, or vascular   aneurysm.      < end of copied text >

## 2021-12-10 NOTE — DISCHARGE NOTE NURSING/CASE MANAGEMENT/SOCIAL WORK - [id number]_GEN_ALL_CORE
For information on Fall & Injury Prevention, visit: [email or web address] OR  [email or web address] OR  https://www.cdc.gov/steadi/patient.html

## 2021-12-10 NOTE — CONSULT NOTE ADULT - SUBJECTIVE AND OBJECTIVE BOX
89yF from [facility] was admitted on 12-07 for an unwitnessed fall. Patient was at her baseline upon EMS arrival. She has a hx of multiple falls. Last fall was on 10/26, was seen in [facility] ED and workup was unremarkable. Pt is aaox2 at baseline, non compliant with her walker. Did not tolerate Statin in past due to myopathy. Code staus DNR/DNI.    Imaging showed:  HEAD CT 12/7- New subacute/chronic left frontal lobe infarct. Consider contrast-enhanced MRI for further evaluation. No acute intracranial hemorrhage.   CAP CT 12/7-  Noevidence of acute visceral organ or bony injury on this noncontrast CT. Healing bilateral rib fractures.  C SPINE CT 12/7- Moderate spondylosis. No acute fracture dislocation cervical spine.      REVIEW OF SYSTEMS  Constitutional - No fever, No weight loss, No fatigue  HEENT - No eye pain, No visual disturbances, No difficulty hearing, No tinnitus, No vertigo, No neck pain  Respiratory - No cough, No wheezing, No shortness of breath  Cardiovascular - No chest pain, No palpitations  Gastrointestinal - No abdominal pain, No nausea, No vomiting, No diarrhea, No constipation  Genitourinary - No dysuria, No frequency, No hematuria, No incontinence  Neurological - No headaches, No memory loss, No loss of strength, No numbness, No tremors  Skin - No itching, No rashes, No lesions   Endocrine - No temperature intolerance  Musculoskeletal - No joint pain, No joint swelling, No muscle pain  Psychiatric - No depression, No anxiety    VITALS  T(C): 36.4 (12-10-21 @ 05:05), Max: 36.8 (12-09-21 @ 10:52)  HR: 83 (12-10-21 @ 08:05) (73 - 83)  BP: 147/73 (12-10-21 @ 05:05) (147/73 - 191/79)  RR: 18 (12-10-21 @ 05:05) (18 - 18)  SpO2: 95% (12-10-21 @ 05:05) (95% - 98%)  Wt(kg): --    PAST MEDICAL & SURGICAL HISTORY  Dementia    DM (diabetes mellitus)        SOCIAL HISTORY - as per documentation/history  Smoking - None  EtOH - None  Drugs - None    FUNCTIONAL HISTORY  Lives in [facility]  Independent prior, ambulates with RW although she is noncompliant.    CURRENT FUNCTIONAL STATUS  Bed Mobility: Rolling/Turning:     · Level of [place]	minimum assist (75% patients effort)  · Physical Assist/Nonphysical Assist	1 person assist; verbal cues    Bed Mobility: Sit to Supine:     · Level of [place]	minimum assist (75% patients effort)  · Physical Assist/Nonphysical Assist	1 person assist; verbal cues    Bed Mobility: Supine to Sit:     · Level of [place]	minimum assist (75% patients effort)  · Physical Assist/Nonphysical Assist	1 person assist; verbal cues    Transfer: Bed to Chair:     Transfer Skill: Bed to Chair   · Level of [place]	unable to perform  · Weight-Bearing Restrictions	Chair not available, pt in hallway of ED    Transfer: Chair to Bed:     · Level of [place]	unable to perform    Transfer: Sit to Stand:     · Level of [place]	minimum assist (75% patients effort)  · Physical Assist/Nonphysical Assist	1 person assist; verbal cues  · Weight-Bearing Restrictions	full weight-bearing  · Assistive Device	rolling walker    Transfer: Stand to Sit:     · Level of [place]	minimum assist (75% patients effort)  · Physical Assist/Nonphysical Assist	1 person assist; verbal cues  · Weight-Bearing Restrictions	full weight-bearing  · Assistive Device	rolling walker    Gait Skills:     · Level of [place]	minimum assist (75% patients effort); moderate assist (50% patients effort)  · Physical Assist/Nonphysical Assist	1 person assist; verbal cues  · Weight-Bearing Restrictions	full weight-bearing  · Assistive Device	rolling walker  · Gait Distance	25 feet      FAMILY HISTORY       RECENT LABS/IMAGING    12-09    141  |  102  |  11.4  ----------------------------<  92  4.3   |  22.0  |  0.67    Ca    8.9      09 Dec 2021 09:39          ALLERGIES  simvastatin (Muscle Pain)      MEDICATIONS   acetaminophen     Tablet .. 650 milliGRAM(s) Oral every 6 hours PRN  aspirin enteric coated 81 milliGRAM(s) Oral daily  dextrose 40% Gel 15 Gram(s) Oral once  dextrose 5%. 1000 milliLiter(s) IV Continuous <Continuous>  dextrose 5%. 1000 milliLiter(s) IV Continuous <Continuous>  dextrose 50% Injectable 25 Gram(s) IV Push once  dextrose 50% Injectable 12.5 Gram(s) IV Push once  dextrose 50% Injectable 25 Gram(s) IV Push once  glucagon  Injectable 1 milliGRAM(s) IntraMuscular once  heparin   Injectable 5000 Unit(s) SubCutaneous every 8 hours  insulin lispro (ADMELOG) corrective regimen sliding scale   SubCutaneous three times a day before meals  insulin lispro (ADMELOG) corrective regimen sliding scale   SubCutaneous at bedtime  labetalol Injectable 5 milliGRAM(s) IV Push every 6 hours PRN  pantoprazole    Tablet 40 milliGRAM(s) Oral before breakfast  QUEtiapine 25 milliGRAM(s) Oral at bedtime  sertraline 25 milliGRAM(s) Oral daily  sodium chloride 0.9%. 1000 milliLiter(s) IV Continuous <Continuous>       89yF from [facility] was admitted on 12-07 for an unwitnessed fall. Patient was at her baseline upon EMS arrival. She has a hx of multiple falls. Last fall was on 10/26, was seen in [facility] ED and workup was unremarkable. Pt is aaox2 at baseline, non compliant with her walker. Did not tolerate Statin in past due to myopathy. Code staus DNR/DNI.    Imaging showed:  HEAD CT 12/7- New subacute/chronic left frontal lobe infarct. Consider contrast-enhanced MRI for further evaluation. No acute intracranial hemorrhage.   CAP CT 12/7-  Noevidence of acute visceral organ or bony injury on this noncontrast CT. Healing bilateral rib fractures.  C SPINE CT 12/7- Moderate spondylosis. No acute fracture dislocation cervical spine.      REVIEW OF SYSTEMS  Constitutional - No fever, No weight loss, No fatigue  HEENT - No eye pain, No visual disturbances, No difficulty hearing, No tinnitus, No vertigo, No neck pain  Respiratory - No cough, No wheezing, No shortness of breath  Cardiovascular - No chest pain, No palpitations  Gastrointestinal - No abdominal pain, No nausea, No vomiting, No diarrhea, No constipation  Genitourinary - No dysuria, No frequency, No hematuria, No incontinence  Neurological - No headaches, No loss of strength, No numbness, No tremors. (+) poor memory, cognitive deficits  Skin - No itching, No rashes, No lesions   Endocrine - No temperature intolerance  Musculoskeletal - No joint pain, No joint swelling, No muscle pain  Psychiatric - No depression, No anxiety    VITALS  T(C): 36.4 (12-10-21 @ 05:05), Max: 36.8 (12-09-21 @ 10:52)  HR: 83 (12-10-21 @ 08:05) (73 - 83)  BP: 147/73 (12-10-21 @ 05:05) (147/73 - 191/79)  RR: 18 (12-10-21 @ 05:05) (18 - 18)  SpO2: 95% (12-10-21 @ 05:05) (95% - 98%)  Wt(kg): --    PAST MEDICAL & SURGICAL HISTORY  Dementia    DM (diabetes mellitus)        SOCIAL HISTORY - as per documentation/history  Smoking - None  EtOH - None  Drugs - None    FUNCTIONAL HISTORY  Lives in [facility]  Independent prior, ambulates with RW although she is noncompliant.    CURRENT FUNCTIONAL STATUS  Bed Mobility: Rolling/Turning:     · Level of [place]	minimum assist (75% patients effort)  · Physical Assist/Nonphysical Assist	1 person assist; verbal cues    Bed Mobility: Sit to Supine:     · Level of [place]	minimum assist (75% patients effort)  · Physical Assist/Nonphysical Assist	1 person assist; verbal cues    Bed Mobility: Supine to Sit:     · Level of [place]	minimum assist (75% patients effort)  · Physical Assist/Nonphysical Assist	1 person assist; verbal cues    Transfer: Bed to Chair:     Transfer Skill: Bed to Chair   · Level of [place]	unable to perform  · Weight-Bearing Restrictions	Chair not available, pt in hallway of ED    Transfer: Chair to Bed:     · Level of [place]	unable to perform    Transfer: Sit to Stand:     · Level of [place]	minimum assist (75% patients effort)  · Physical Assist/Nonphysical Assist	1 person assist; verbal cues  · Weight-Bearing Restrictions	full weight-bearing  · Assistive Device	rolling walker    Transfer: Stand to Sit:     · Level of [place]	minimum assist (75% patients effort)  · Physical Assist/Nonphysical Assist	1 person assist; verbal cues  · Weight-Bearing Restrictions	full weight-bearing  · Assistive Device	rolling walker    Gait Skills:     · Level of [place]	minimum assist (75% patients effort); moderate assist (50% patients effort)  · Physical Assist/Nonphysical Assist	1 person assist; verbal cues  · Weight-Bearing Restrictions	full weight-bearing  · Assistive Device	rolling walker  · Gait Distance	25 feet      FAMILY HISTORY       RECENT LABS/IMAGING    12-09    141  |  102  |  11.4  ----------------------------<  92  4.3   |  22.0  |  0.67    Ca    8.9      09 Dec 2021 09:39          ALLERGIES  simvastatin (Muscle Pain)      MEDICATIONS   acetaminophen     Tablet .. 650 milliGRAM(s) Oral every 6 hours PRN  aspirin enteric coated 81 milliGRAM(s) Oral daily  dextrose 40% Gel 15 Gram(s) Oral once  dextrose 5%. 1000 milliLiter(s) IV Continuous <Continuous>  dextrose 5%. 1000 milliLiter(s) IV Continuous <Continuous>  dextrose 50% Injectable 25 Gram(s) IV Push once  dextrose 50% Injectable 12.5 Gram(s) IV Push once  dextrose 50% Injectable 25 Gram(s) IV Push once  glucagon  Injectable 1 milliGRAM(s) IntraMuscular once  heparin   Injectable 5000 Unit(s) SubCutaneous every 8 hours  insulin lispro (ADMELOG) corrective regimen sliding scale   SubCutaneous three times a day before meals  insulin lispro (ADMELOG) corrective regimen sliding scale   SubCutaneous at bedtime  labetalol Injectable 5 milliGRAM(s) IV Push every 6 hours PRN  pantoprazole    Tablet 40 milliGRAM(s) Oral before breakfast  QUEtiapine 25 milliGRAM(s) Oral at bedtime  sertraline 25 milliGRAM(s) Oral daily  sodium chloride 0.9%. 1000 milliLiter(s) IV Continuous <Continuous>      ----------------------------------------------------------------------------------------  PHYSICAL EXAM  Constitutional - NAD, Comfortable  HEENT - NCAT, EOMI  Neck - Supple, No limited ROM  Chest - Breathing comfortably, No wheezing  Cardiovascular - Warm, well perfused  Abdomen - Soft, NTND  Extremities - No C/C/E, No calf tenderness   Neurologic Exam -                    Cognitive - AAO x1 to self. Delayed recall 0/3. Impaired concentration, unable to state days of week backwards     Communication - Fluent, No dysarthria. Perseverates.      Cranial Nerves - CN 2-12 intact     Motor - No focal deficits                    LEFT    UE - ShAB 5/5, EF 5/5, EE 5/5, WE 5/5,  5/5                    RIGHT UE - ShAB 5/5, EF 5/5, EE 5/5, WE 5/5,  5/5                    LEFT    LE - HF 5/5, KE 5/5, DF 5/5, PF 5/5                    RIGHT LE - HF 5/5, KE 5/5, DF 5/5, PF 5/5        Sensory - Intact to LT     Reflexes - DTR Intact, No primitive reflexive     Coordination - FTN intact     Balance - WNL Static  Psychiatric - Mood stable, Affect WNL  ----------------------------------------------------------------------------------------  ASSESSMENT/PLAN  89yFemale with functional deficits after fall and CVA    Imaging reviewed  Continue ASA  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 Patient will be able to tolerate 3 hours a day.  Will continue to follow and current recommendations may change if functional progress changes.    Recommend ongoing mobilization by staff to maintain cardiopulmonary function and prevention of secondary complications related to debility. Discussed with rehab team.    89yF from [facility] was admitted on 12-07 for an unwitnessed fall. Patient was at her baseline upon EMS arrival. She has a hx of multiple falls. Last fall was on 10/26, was seen in [facility] ED and workup was unremarkable. Pt is aaox2 at baseline, non compliant with her walker. Did not tolerate Statin in past due to myopathy. Code staus DNR/DNI.    Imaging showed:  HEAD CT 12/7- New subacute/chronic left frontal lobe infarct. Consider contrast-enhanced MRI for further evaluation. No acute intracranial hemorrhage.   CAP CT 12/7-  Noevidence of acute visceral organ or bony injury on this noncontrast CT. Healing bilateral rib fractures.  C SPINE CT 12/7- Moderate spondylosis. No acute fracture dislocation cervical spine.      REVIEW OF SYSTEMS  Constitutional - No fever, No weight loss, No fatigue  HEENT - No eye pain, No visual disturbances, No difficulty hearing, No tinnitus, No vertigo, No neck pain  Respiratory - No cough, No wheezing, No shortness of breath  Cardiovascular - No chest pain, No palpitations  Gastrointestinal - No abdominal pain, No nausea, No vomiting, No diarrhea, No constipation  Genitourinary - No dysuria, No frequency, No hematuria, No incontinence  Neurological - No headaches, No loss of strength, No numbness, No tremors. (+) poor memory, cognitive deficits  Skin - No itching, No rashes, No lesions   Endocrine - No temperature intolerance  Musculoskeletal - No joint pain, No joint swelling, No muscle pain  Psychiatric - No depression, No anxiety    VITALS  T(C): 36.4 (12-10-21 @ 05:05), Max: 36.8 (12-09-21 @ 10:52)  HR: 83 (12-10-21 @ 08:05) (73 - 83)  BP: 147/73 (12-10-21 @ 05:05) (147/73 - 191/79)  RR: 18 (12-10-21 @ 05:05) (18 - 18)  SpO2: 95% (12-10-21 @ 05:05) (95% - 98%)  Wt(kg): --    PAST MEDICAL & SURGICAL HISTORY  Dementia    DM (diabetes mellitus)        SOCIAL HISTORY - as per documentation/history  Smoking - None  EtOH - None  Drugs - None    FUNCTIONAL HISTORY  Lives in [facility]  Independent prior, ambulates with RW although she is noncompliant.    CURRENT FUNCTIONAL STATUS  Bed Mobility: Rolling/Turning:     · Level of [place]	minimum assist (75% patients effort)  · Physical Assist/Nonphysical Assist	1 person assist; verbal cues    Bed Mobility: Sit to Supine:     · Level of [place]	minimum assist (75% patients effort)  · Physical Assist/Nonphysical Assist	1 person assist; verbal cues    Bed Mobility: Supine to Sit:     · Level of [place]	minimum assist (75% patients effort)  · Physical Assist/Nonphysical Assist	1 person assist; verbal cues    Transfer: Bed to Chair:     Transfer Skill: Bed to Chair   · Level of [place]	unable to perform  · Weight-Bearing Restrictions	Chair not available, pt in hallway of ED    Transfer: Chair to Bed:     · Level of [place]	unable to perform    Transfer: Sit to Stand:     · Level of [place]	minimum assist (75% patients effort)  · Physical Assist/Nonphysical Assist	1 person assist; verbal cues  · Weight-Bearing Restrictions	full weight-bearing  · Assistive Device	rolling walker    Transfer: Stand to Sit:     · Level of [place]	minimum assist (75% patients effort)  · Physical Assist/Nonphysical Assist	1 person assist; verbal cues  · Weight-Bearing Restrictions	full weight-bearing  · Assistive Device	rolling walker    Gait Skills:     · Level of [place]	minimum assist (75% patients effort); moderate assist (50% patients effort)  · Physical Assist/Nonphysical Assist	1 person assist; verbal cues  · Weight-Bearing Restrictions	full weight-bearing  · Assistive Device	rolling walker  · Gait Distance	25 feet      FAMILY HISTORY       RECENT LABS/IMAGING    12-09    141  |  102  |  11.4  ----------------------------<  92  4.3   |  22.0  |  0.67    Ca    8.9      09 Dec 2021 09:39          ALLERGIES  simvastatin (Muscle Pain)      MEDICATIONS   acetaminophen     Tablet .. 650 milliGRAM(s) Oral every 6 hours PRN  aspirin enteric coated 81 milliGRAM(s) Oral daily  dextrose 40% Gel 15 Gram(s) Oral once  dextrose 5%. 1000 milliLiter(s) IV Continuous <Continuous>  dextrose 5%. 1000 milliLiter(s) IV Continuous <Continuous>  dextrose 50% Injectable 25 Gram(s) IV Push once  dextrose 50% Injectable 12.5 Gram(s) IV Push once  dextrose 50% Injectable 25 Gram(s) IV Push once  glucagon  Injectable 1 milliGRAM(s) IntraMuscular once  heparin   Injectable 5000 Unit(s) SubCutaneous every 8 hours  insulin lispro (ADMELOG) corrective regimen sliding scale   SubCutaneous three times a day before meals  insulin lispro (ADMELOG) corrective regimen sliding scale   SubCutaneous at bedtime  labetalol Injectable 5 milliGRAM(s) IV Push every 6 hours PRN  pantoprazole    Tablet 40 milliGRAM(s) Oral before breakfast  QUEtiapine 25 milliGRAM(s) Oral at bedtime  sertraline 25 milliGRAM(s) Oral daily  sodium chloride 0.9%. 1000 milliLiter(s) IV Continuous <Continuous>      ----------------------------------------------------------------------------------------  PHYSICAL EXAM  Constitutional - NAD, Comfortable  HEENT - NCAT, EOMI  Neck - Supple, No limited ROM  Chest - Breathing comfortably, No wheezing  Cardiovascular - Warm, well perfused  Abdomen - Soft, NTND  Extremities - No C/C/E, No calf tenderness   Neurologic Exam -                    Cognitive - AAO x1 to self. Delayed recall 0/3. Impaired concentration, unable to state days of week backwards     Communication - Fluent, No dysarthria. Perseverates.      Cranial Nerves - CN 2-12 intact     Motor - No focal deficits                    LEFT    UE - ShAB 5/5, EF 5/5, EE 5/5, WE 5/5,  5/5                    RIGHT UE - ShAB 5/5, EF 5/5, EE 5/5, WE 5/5,  5/5                    LEFT    LE - HF 5/5, KE 5/5, DF 5/5, PF 5/5                    RIGHT LE - HF 5/5, KE 5/5, DF 5/5, PF 5/5        Sensory - Intact to LT     Reflexes - DTR Intact, No primitive reflexive     Coordination - FTN intact     Balance - WNL Static  Psychiatric - Mood stable, Affect WNL  ----------------------------------------------------------------------------------------  ASSESSMENT/PLAN  89yFemale with functional deficits after fall and CVA    Imaging reviewed  Continue ASA  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 Patient was at her baseline upon EMS arrival. She has a hx of multiple falls. Last fall was on 10/26, was seen in [facility] ED and workup was unremarkable. Pt is aaox2 at baseline, non compliant with her walker. Did not tolerate Statin in past due to myopathy. Code staus DNR/DNI.    Imaging showed:  HEAD CT 12/7- New subacute/chronic left frontal lobe infarct. Consider contrast-enhanced MRI for further evaluation. No acute intracranial hemorrhage.   CAP CT 12/7-  Noevidence of acute visceral organ or bony injury on this noncontrast CT. Healing bilateral rib fractures.  C SPINE CT 12/7- Moderate spondylosis. No acute fracture dislocation cervical spine.      REVIEW OF SYSTEMS  Constitutional - No fever, No weight loss, No fatigue  HEENT - No eye pain, No visual disturbances, No difficulty hearing, No tinnitus, No vertigo, No neck pain  Respiratory - No cough, No wheezing, No shortness of breath  Cardiovascular - No chest pain, No palpitations  Gastrointestinal - No abdominal pain, No nausea, No vomiting, No diarrhea, No constipation  Genitourinary - No dysuria, No frequency, No hematuria, No incontinence  Neurological - No headaches, No loss of strength, No numbness, No tremors. (+) poor memory, cognitive deficits  Skin - No itching, No rashes, No lesions   Endocrine - No temperature intolerance  Musculoskeletal - No joint pain, No joint swelling, No muscle pain  Psychiatric - No depression, No anxiety    VITALS  T(C): 36.4 (12-10-21 @ 05:05), Max: 36.8 (12-09-21 @ 10:52)  HR: 83 (12-10-21 @ 08:05) (73 - 83)  BP: 147/73 (12-10-21 @ 05:05) (147/73 - 191/79)  RR: 18 (12-10-21 @ 05:05) (18 - 18)  SpO2: 95% (12-10-21 @ 05:05) (95% - 98%)  Wt(kg): --    PAST MEDICAL & SURGICAL HISTORY  Dementia    DM (diabetes mellitus)        SOCIAL HISTORY - as per documentation/history  Smoking - None  EtOH - None  Drugs - None    FUNCTIONAL HISTORY  Lives in [facility]  Independent prior, ambulates with RW although she is noncompliant.    CURRENT FUNCTIONAL STATUS  Bed Mobility: Rolling/Turning:     · Level of [place]	minimum assist (75% patients effort)  · Physical Assist/Nonphysical Assist	1 person assist; verbal cues    Bed Mobility: Sit to Supine:     · Level of [place]	minimum assist (75% patients effort)  · Physical Assist/Nonphysical Assist	1 person assist; verbal cues    Bed Mobility: Supine to Sit:     · Level of [place]	minimum assist (75% patients effort)  · Physical Assist/Nonphysical Assist	1 person assist; verbal cues    Transfer: Bed to Chair:     Transfer Skill: Bed to Chair   · Level of [place]	unable to perform  · Weight-Bearing Restrictions	Chair not available, pt in hallway of ED    Transfer: Chair to Bed:     · Level of [place]	unable to perform    Transfer: Sit to Stand:     · Level of [place]	minimum assist (75% patients effort)  · Physical Assist/Nonphysical Assist	1 person assist; verbal cues  · Weight-Bearing Restrictions	full weight-bearing  · Assistive Device	rolling walker    Transfer: Stand to Sit:     · Level of [place]	minimum assist (75% patients effort)  · Physical Assist/Nonphysical Assist	1 person assist; verbal cues  · Weight-Bearing Restrictions	full weight-bearing  · Assistive Device	rolling walker    Gait Skills:     · Level of [place]	minimum assist (75% patients effort); moderate assist (50% patients effort)  · Physical Assist/Nonphysical Assist	1 person assist; verbal cues  · Weight-Bearing Restrictions	full weight-bearing  · Assistive Device	rolling walker  · Gait Distance	25 feet      FAMILY HISTORY       RECENT LABS/IMAGING    12-09    141  |  102  |  11.4  ----------------------------<  92  4.3   |  22.0  |  0.67    Ca    8.9      09 Dec 2021 09:39          ALLERGIES  simvastatin (Muscle Pain)      MEDICATIONS   acetaminophen     Tablet .. 650 milliGRAM(s) Oral every 6 hours PRN  aspirin enteric coated 81 milliGRAM(s) Oral daily  dextrose 40% Gel 15 Gram(s) Oral once  dextrose 5%. 1000 milliLiter(s) IV Continuous <Continuous>  dextrose 5%. 1000 milliLiter(s) IV Continuous <Continuous>  dextrose 50% Injectable 25 Gram(s) IV Push once  dextrose 50% Injectable 12.5 Gram(s) IV Push once  dextrose 50% Injectable 25 Gram(s) IV Push once  glucagon  Injectable 1 milliGRAM(s) IntraMuscular once  heparin   Injectable 5000 Unit(s) SubCutaneous every 8 hours  insulin lispro (ADMELOG) corrective regimen sliding scale   SubCutaneous three times a day before meals  insulin lispro (ADMELOG) corrective regimen sliding scale   SubCutaneous at bedtime  labetalol Injectable 5 milliGRAM(s) IV Push every 6 hours PRN  pantoprazole    Tablet 40 milliGRAM(s) Oral before breakfast  QUEtiapine 25 milliGRAM(s) Oral at bedtime  sertraline 25 milliGRAM(s) Oral daily  sodium chloride 0.9%. 1000 milliLiter(s) IV Continuous <Continuous>      ----------------------------------------------------------------------------------------  PHYSICAL EXAM  Constitutional - NAD, Comfortable  HEENT - NCAT, EOMI  Neck - Supple, No limited ROM  Chest - Breathing comfortably, No wheezing  Cardiovascular - Warm, well perfused  Abdomen - Soft, NTND  Extremities - No C/C/E, No calf tenderness   Neurologic Exam -                    Cognitive - AAO x1 to self. Delayed recall 0/3. Impaired concentration, unable to state days of week backwards     Communication - Fluent, No dysarthria. Perseverates.      Cranial Nerves - CN 2-12 intact     Motor - No focal deficits                    LEFT    UE - ShAB 5/5, EF 5/5, EE 5/5, WE 5/5,  5/5                    RIGHT UE - ShAB 5/5, EF 5/5, EE 5/5, WE 5/5,  5/5                    LEFT    LE - HF 5/5, KE 5/5, DF 5/5, PF 5/5                    RIGHT LE - HF 5/5, KE 5/5, DF 5/5, PF 5/5        Sensory - Intact to LT     Reflexes - DTR Intact, No primitive reflexive     Coordination - FTN intact     Balance - WNL Static  Psychiatric - Mood stable, Affect WNL  ----------------------------------------------------------------------------------------  ASSESSMENT/PLAN  89yFemale with functional deficits after fall and CVA    Imaging reviewed  Continue ASA  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   Patient has a subacute stroke and reason for admission is a fall and likely has had this stroke for a while. Patient is known to have multiple falls and seems to have been an incidental finding in setting of neuro exam being non-focal.  Therefore, if patient unable to return to prior intermediate with ongoing mobilization, recommend LUIS ENRIQUE.     Will continue to follow and current recommendations may change if functional progress changes.    Recommend ongoing mobilization by staff to maintain cardiopulmonary function and prevention of secondary complications related to debility. Discussed with rehab team.

## 2021-12-21 NOTE — CDI QUERY NOTE - NSCDIOTHERTXTBX2_GEN_ALL_CORE_FT
Documentation noted of altered mental status with DM type II with high glucose levels. Patient has history of dementia, as per daughter patients' mental status is back to baseline.     Please clarify the etiology of the altered mental status   A) Metabolic encephalopathy 2/2 glucose imbalance superimposed on dementia  B) Other, please specify  C) Not clinically significant     Supporting Documentation:    Progress Note Adult-Hospitalist Attending [Charted Location: [facility] 5TWR 5212 02] [Authored: 10-Dec-2021 13:01    DM type 2  -a1c 6.0  -[facility]    Dementia with behavior disorder   -PER DAUGHTER PT IS IN HER BASELINE NOW  - Was on Aricept in past but DC by pcp  - continue Seroquel sertraline    POCT Blood Glucose.: 98 mg/dL (12.10.21 @ 17:13)   POCT Blood Glucose.: 226 mg/dL (12.10.21 @ 13:14)   POCT Blood Glucose.: 118 mg/dL (12.10.21 @ 09:11)   POCT Blood Glucose.: 146 mg/dL (12.09.21 @ 22:01)   POCT Blood Glucose.: 97 mg/dL (12.09.21 @ 17:31)   POCT Blood Glucose.: 103 mg/dL (12.09.21 @ 11:47)   POCT Blood Glucose.: 89 mg/dL (12.09.21 @ 04:44)   POCT Blood Glucose.: 115 mg/dL (12.09.21 @ 01:07)   POCT Blood Glucose.: 102 mg/dL (12.08.21 @ 20:49)   POCT Blood Glucose.: 105 mg/dL (12.08.21 @ 16:27)   POCT Blood Glucose.: 108 mg/dL (12.08.21 @ 11:18)   POCT Blood Glucose.: 111 mg/dL (12.08.21 @ 07:47)   POCT Blood Glucose.: 121 mg/dL (12.07.21 @ 22:54)   POCT Blood Glucose.: 129 mg/dL (10.26.21 @ 19:07)   POCT Blood Glucose.: 145 mg/dL (10.26.21 @ 12:37)   POCT Blood Glucose.: 160 mg/dL (10.26.21 @ 07:47)   POCT Blood Glucose.: 137 mg/dL (10.26.21 @ 06:51)   POCT Blood Glucose.: 86 mg/dL (06.18.21 @ 04:38)

## 2021-12-21 NOTE — CDI QUERY NOTE - NSCDIOTHERTXTBX_GEN_ALL_CORE_HH
Clarification is needed on the type of subacute hemorrhage as documentation noted s/p fall.    Please clarify the type of subacute hemorrhage  A) Traumatic subacute hemorrhage 2/2 to fall  B) Non-traumatic subacute hemorrhage  C) Other, please specify  D) Not clinically significant      Supporting Documentation:    Progress Note Adult-Hospitalist Attending [Charted Location: [facility] 5TWR 5212 02] [Authored: 10-Dec-2021 13:01  patient from [facility] s/p unwitnessed fall. Ed found to have New subacute/chronic left frontal lobe infarct.    subacute hemorrhage ruled out acute infarct by MRI brain    RADIOLOGY & ADDITIONAL TESTS:  Results Reviewed:   < from: MR Head No Cont (12.09.21 @ 16:26) >  IMPRESSION:    2.5 x 2.7 cm well demarcated region of T1 and T2 hyperintense signal in   the left anterolateral frontal lobe. The region demonstrates restricted   diffusion andsome susceptibility artifact.    Findings are consistent with the presence of late subacute hemorrhage.    No acute infarction.    < end of copied text >

## 2021-12-28 ENCOUNTER — NON-APPOINTMENT (OUTPATIENT)
Age: 86
End: 2021-12-28

## 2021-12-28 NOTE — HISTORY OF PRESENT ILLNESS
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  per EMS staff states patient is at baseline, patient with complaints of back pain which she has had before. no signs of trauma noted. Pt is poor historian. HX from daughter and ED physician. Per Daughter, pt has multiple fall,last fall oct 26, 2021, work up was negative. Pt was in [facility] ED. Pt is aaox2,non compliant with her walker. Did not tolerate Statin in past. code staus DNR/DNI.\par Currently pt is AAOX2.\par \par MRI:\par MR Head No Cont (12.09.21 @ 16:26) >\par IMPRESSION:\par \par 2.5 x 2.7 cm well demarcated region of T1 and T2 hyperintense signal in \par the left anterolateral frontal lobe. The region demonstrates restricted \par diffusion andsome susceptibility artifact.\par \par Findings are consistent with the presence of late subacute hemorrhage.\par \par No acute infarction.\par \par ASSESSMENT/ PLAN: \par \par - Neuro checks and vital signs Q 4 hours.\par - SBP goal  normotensive after 12-9-21.\par - Continue ASA 81 mg PO QD.\par - Statin - not prescribed as she cannot tolerate statin.\par - Telemetry monitoring.\par - CTA of head and neck report reviewed\par - MRI Brain report as above reviewed.\par - TTE report as above reviewed.\par - Family ( Daughter) does not wish aggressive management plans to be persued.\par - PT OT SLP following\par - SCD/ SQ Heparin for DVT prophylaxis.\par \par \par

## 2021-12-29 ENCOUNTER — APPOINTMENT (OUTPATIENT)
Dept: NEUROLOGY | Facility: CLINIC | Age: 86
End: 2021-12-29

## 2021-12-30 PROCEDURE — 80061 LIPID PANEL: CPT

## 2021-12-30 PROCEDURE — 74176 CT ABD & PELVIS W/O CONTRAST: CPT | Mod: MA

## 2021-12-30 PROCEDURE — 80048 BASIC METABOLIC PNL TOTAL CA: CPT

## 2021-12-30 PROCEDURE — 72125 CT NECK SPINE W/O DYE: CPT | Mod: MA

## 2021-12-30 PROCEDURE — 70496 CT ANGIOGRAPHY HEAD: CPT

## 2021-12-30 PROCEDURE — 0225U NFCT DS DNA&RNA 21 SARSCOV2: CPT

## 2021-12-30 PROCEDURE — 94640 AIRWAY INHALATION TREATMENT: CPT

## 2021-12-30 PROCEDURE — 83036 HEMOGLOBIN GLYCOSYLATED A1C: CPT

## 2021-12-30 PROCEDURE — 97167 OT EVAL HIGH COMPLEX 60 MIN: CPT

## 2021-12-30 PROCEDURE — 82962 GLUCOSE BLOOD TEST: CPT

## 2021-12-30 PROCEDURE — 70551 MRI BRAIN STEM W/O DYE: CPT

## 2021-12-30 PROCEDURE — 93005 ELECTROCARDIOGRAM TRACING: CPT

## 2021-12-30 PROCEDURE — 70450 CT HEAD/BRAIN W/O DYE: CPT | Mod: MA

## 2021-12-30 PROCEDURE — 86850 RBC ANTIBODY SCREEN: CPT

## 2021-12-30 PROCEDURE — 93880 EXTRACRANIAL BILAT STUDY: CPT

## 2021-12-30 PROCEDURE — 36415 COLL VENOUS BLD VENIPUNCTURE: CPT

## 2021-12-30 PROCEDURE — 85025 COMPLETE CBC W/AUTO DIFF WBC: CPT

## 2021-12-30 PROCEDURE — 93306 TTE W/DOPPLER COMPLETE: CPT

## 2021-12-30 PROCEDURE — 70498 CT ANGIOGRAPHY NECK: CPT

## 2021-12-30 PROCEDURE — 92610 EVALUATE SWALLOWING FUNCTION: CPT

## 2021-12-30 PROCEDURE — 99285 EMERGENCY DEPT VISIT HI MDM: CPT

## 2021-12-30 PROCEDURE — 86900 BLOOD TYPING SEROLOGIC ABO: CPT

## 2021-12-30 PROCEDURE — 71250 CT THORAX DX C-: CPT | Mod: MA

## 2021-12-30 PROCEDURE — 80053 COMPREHEN METABOLIC PANEL: CPT

## 2021-12-30 PROCEDURE — 86901 BLOOD TYPING SEROLOGIC RH(D): CPT

## 2021-12-30 PROCEDURE — 87635 SARS-COV-2 COVID-19 AMP PRB: CPT
